# Patient Record
Sex: MALE | Race: WHITE | NOT HISPANIC OR LATINO
[De-identification: names, ages, dates, MRNs, and addresses within clinical notes are randomized per-mention and may not be internally consistent; named-entity substitution may affect disease eponyms.]

---

## 2018-08-31 ENCOUNTER — TRANSCRIPTION ENCOUNTER (OUTPATIENT)
Age: 53
End: 2018-08-31

## 2018-08-31 ENCOUNTER — EMERGENCY (EMERGENCY)
Facility: HOSPITAL | Age: 53
LOS: 1 days | Discharge: ANOTHER HOSPITAL | End: 2018-08-31
Attending: EMERGENCY MEDICINE
Payer: COMMERCIAL

## 2018-08-31 ENCOUNTER — INPATIENT (INPATIENT)
Facility: HOSPITAL | Age: 53
LOS: 1 days | Discharge: HOME | DRG: 247 | End: 2018-09-02
Attending: INTERNAL MEDICINE | Admitting: INTERNAL MEDICINE
Payer: COMMERCIAL

## 2018-08-31 VITALS
HEART RATE: 63 BPM | TEMPERATURE: 98 F | SYSTOLIC BLOOD PRESSURE: 155 MMHG | OXYGEN SATURATION: 100 % | DIASTOLIC BLOOD PRESSURE: 62 MMHG | RESPIRATION RATE: 19 BRPM

## 2018-08-31 VITALS
DIASTOLIC BLOOD PRESSURE: 99 MMHG | HEART RATE: 64 BPM | OXYGEN SATURATION: 100 % | RESPIRATION RATE: 14 BRPM | TEMPERATURE: 97 F | SYSTOLIC BLOOD PRESSURE: 158 MMHG

## 2018-08-31 VITALS
HEART RATE: 66 BPM | DIASTOLIC BLOOD PRESSURE: 93 MMHG | WEIGHT: 220.02 LBS | RESPIRATION RATE: 18 BRPM | OXYGEN SATURATION: 96 % | HEIGHT: 67 IN | SYSTOLIC BLOOD PRESSURE: 141 MMHG

## 2018-08-31 DIAGNOSIS — I21.4 NON-ST ELEVATION (NSTEMI) MYOCARDIAL INFARCTION: ICD-10-CM

## 2018-08-31 DIAGNOSIS — S83.207S UNSPECIFIED TEAR OF UNSPECIFIED MENISCUS, CURRENT INJURY, LEFT KNEE, SEQUELA: Chronic | ICD-10-CM

## 2018-08-31 DIAGNOSIS — Z86.018 PERSONAL HISTORY OF OTHER BENIGN NEOPLASM: Chronic | ICD-10-CM

## 2018-08-31 LAB
ALBUMIN SERPL ELPH-MCNC: 3.9 G/DL — SIGNIFICANT CHANGE UP (ref 3.3–5)
ALP SERPL-CCNC: 67 U/L — SIGNIFICANT CHANGE UP (ref 40–120)
ALT FLD-CCNC: 33 U/L — SIGNIFICANT CHANGE UP (ref 12–78)
ANION GAP SERPL CALC-SCNC: 7 MMOL/L — SIGNIFICANT CHANGE UP (ref 5–17)
APTT BLD: 34.7 SEC — SIGNIFICANT CHANGE UP (ref 27.5–37.4)
AST SERPL-CCNC: 21 U/L — SIGNIFICANT CHANGE UP (ref 15–37)
BASOPHILS # BLD AUTO: 0.1 K/UL — SIGNIFICANT CHANGE UP (ref 0–0.2)
BASOPHILS NFR BLD AUTO: 1.5 % — SIGNIFICANT CHANGE UP (ref 0–2)
BILIRUB SERPL-MCNC: 0.6 MG/DL — SIGNIFICANT CHANGE UP (ref 0.2–1.2)
BUN SERPL-MCNC: 17 MG/DL — SIGNIFICANT CHANGE UP (ref 7–23)
CALCIUM SERPL-MCNC: 8.5 MG/DL — SIGNIFICANT CHANGE UP (ref 8.5–10.1)
CHLORIDE SERPL-SCNC: 105 MMOL/L — SIGNIFICANT CHANGE UP (ref 96–108)
CK MB BLD-MCNC: 10.1 % — HIGH (ref 0–3.5)
CK MB CFR SERPL CALC: 1 NG/ML — SIGNIFICANT CHANGE UP (ref 0–3.6)
CK MB CFR SERPL CALC: 55.9 NG/ML — HIGH (ref 0–3.6)
CK SERPL-CCNC: 551 U/L — HIGH (ref 26–308)
CO2 SERPL-SCNC: 28 MMOL/L — SIGNIFICANT CHANGE UP (ref 22–31)
CREAT SERPL-MCNC: 0.99 MG/DL — SIGNIFICANT CHANGE UP (ref 0.5–1.3)
EOSINOPHIL # BLD AUTO: 0.22 K/UL — SIGNIFICANT CHANGE UP (ref 0–0.5)
EOSINOPHIL NFR BLD AUTO: 3.3 % — SIGNIFICANT CHANGE UP (ref 0–6)
GLUCOSE SERPL-MCNC: 99 MG/DL — SIGNIFICANT CHANGE UP (ref 70–99)
HCT VFR BLD CALC: 46.1 % — SIGNIFICANT CHANGE UP (ref 39–50)
HGB BLD-MCNC: 16 G/DL — SIGNIFICANT CHANGE UP (ref 13–17)
IMM GRANULOCYTES NFR BLD AUTO: 0.3 % — SIGNIFICANT CHANGE UP (ref 0–1.5)
INR BLD: 1.13 RATIO — SIGNIFICANT CHANGE UP (ref 0.88–1.16)
LYMPHOCYTES # BLD AUTO: 1.63 K/UL — SIGNIFICANT CHANGE UP (ref 1–3.3)
LYMPHOCYTES # BLD AUTO: 24.6 % — SIGNIFICANT CHANGE UP (ref 13–44)
MCHC RBC-ENTMCNC: 30.5 PG — SIGNIFICANT CHANGE UP (ref 27–34)
MCHC RBC-ENTMCNC: 34.7 GM/DL — SIGNIFICANT CHANGE UP (ref 32–36)
MCV RBC AUTO: 88 FL — SIGNIFICANT CHANGE UP (ref 80–100)
MONOCYTES # BLD AUTO: 0.66 K/UL — SIGNIFICANT CHANGE UP (ref 0–0.9)
MONOCYTES NFR BLD AUTO: 10 % — SIGNIFICANT CHANGE UP (ref 2–14)
NEUTROPHILS # BLD AUTO: 3.99 K/UL — SIGNIFICANT CHANGE UP (ref 1.8–7.4)
NEUTROPHILS NFR BLD AUTO: 60.3 % — SIGNIFICANT CHANGE UP (ref 43–77)
PLATELET # BLD AUTO: 319 K/UL — SIGNIFICANT CHANGE UP (ref 150–400)
POTASSIUM SERPL-MCNC: 4.1 MMOL/L — SIGNIFICANT CHANGE UP (ref 3.5–5.3)
POTASSIUM SERPL-SCNC: 4.1 MMOL/L — SIGNIFICANT CHANGE UP (ref 3.5–5.3)
PROT SERPL-MCNC: 7.7 G/DL — SIGNIFICANT CHANGE UP (ref 6–8.3)
PROTHROM AB SERPL-ACNC: 12.3 SEC — SIGNIFICANT CHANGE UP (ref 9.8–12.7)
RBC # BLD: 5.24 M/UL — SIGNIFICANT CHANGE UP (ref 4.2–5.8)
RBC # FLD: 12.9 % — SIGNIFICANT CHANGE UP (ref 10.3–14.5)
SODIUM SERPL-SCNC: 140 MMOL/L — SIGNIFICANT CHANGE UP (ref 135–145)
TROPONIN I SERPL-MCNC: 0.03 NG/ML — SIGNIFICANT CHANGE UP (ref 0.01–0.04)
TROPONIN I SERPL-MCNC: 10.7 NG/ML — HIGH (ref 0.01–0.04)
WBC # BLD: 6.62 K/UL — SIGNIFICANT CHANGE UP (ref 3.8–10.5)
WBC # FLD AUTO: 6.62 K/UL — SIGNIFICANT CHANGE UP (ref 3.8–10.5)

## 2018-08-31 PROCEDURE — 71046 X-RAY EXAM CHEST 2 VIEWS: CPT | Mod: 26

## 2018-08-31 PROCEDURE — 96374 THER/PROPH/DIAG INJ IV PUSH: CPT

## 2018-08-31 PROCEDURE — 71046 X-RAY EXAM CHEST 2 VIEWS: CPT

## 2018-08-31 PROCEDURE — 93005 ELECTROCARDIOGRAM TRACING: CPT

## 2018-08-31 PROCEDURE — 85610 PROTHROMBIN TIME: CPT

## 2018-08-31 PROCEDURE — 85027 COMPLETE CBC AUTOMATED: CPT

## 2018-08-31 PROCEDURE — 80053 COMPREHEN METABOLIC PANEL: CPT

## 2018-08-31 PROCEDURE — 93458 L HRT ARTERY/VENTRICLE ANGIO: CPT | Mod: 26,59

## 2018-08-31 PROCEDURE — 36415 COLL VENOUS BLD VENIPUNCTURE: CPT

## 2018-08-31 PROCEDURE — 82553 CREATINE MB FRACTION: CPT

## 2018-08-31 PROCEDURE — 99285 EMERGENCY DEPT VISIT HI MDM: CPT

## 2018-08-31 PROCEDURE — 85730 THROMBOPLASTIN TIME PARTIAL: CPT

## 2018-08-31 PROCEDURE — 84484 ASSAY OF TROPONIN QUANT: CPT

## 2018-08-31 PROCEDURE — 92941 PRQ TRLML REVSC TOT OCCL AMI: CPT | Mod: LC

## 2018-08-31 PROCEDURE — 82550 ASSAY OF CK (CPK): CPT

## 2018-08-31 PROCEDURE — 93010 ELECTROCARDIOGRAM REPORT: CPT | Mod: 76

## 2018-08-31 PROCEDURE — 99285 EMERGENCY DEPT VISIT HI MDM: CPT | Mod: 25

## 2018-08-31 PROCEDURE — 99152 MOD SED SAME PHYS/QHP 5/>YRS: CPT

## 2018-08-31 RX ORDER — ATORVASTATIN CALCIUM 80 MG/1
1 TABLET, FILM COATED ORAL
Qty: 30 | Refills: 0
Start: 2018-08-31 | End: 2018-09-29

## 2018-08-31 RX ORDER — ASPIRIN/CALCIUM CARB/MAGNESIUM 324 MG
325 TABLET ORAL DAILY
Qty: 0 | Refills: 0 | Status: DISCONTINUED | OUTPATIENT
Start: 2018-08-31 | End: 2018-09-01

## 2018-08-31 RX ORDER — ATORVASTATIN CALCIUM 80 MG/1
80 TABLET, FILM COATED ORAL AT BEDTIME
Qty: 0 | Refills: 0 | Status: DISCONTINUED | OUTPATIENT
Start: 2018-08-31 | End: 2018-09-02

## 2018-08-31 RX ORDER — CLOPIDOGREL BISULFATE 75 MG/1
1 TABLET, FILM COATED ORAL
Qty: 30 | Refills: 11
Start: 2018-08-31 | End: 2019-08-25

## 2018-08-31 RX ORDER — ASPIRIN/CALCIUM CARB/MAGNESIUM 324 MG
81 TABLET ORAL DAILY
Qty: 0 | Refills: 0 | Status: DISCONTINUED | OUTPATIENT
Start: 2018-08-31 | End: 2018-08-31

## 2018-08-31 RX ORDER — CLOPIDOGREL BISULFATE 75 MG/1
75 TABLET, FILM COATED ORAL DAILY
Qty: 0 | Refills: 0 | Status: DISCONTINUED | OUTPATIENT
Start: 2018-08-31 | End: 2018-09-02

## 2018-08-31 RX ORDER — CLOPIDOGREL BISULFATE 75 MG/1
1 TABLET, FILM COATED ORAL
Qty: 0 | Refills: 0 | COMMUNITY

## 2018-08-31 RX ORDER — FLUOXETINE HCL 10 MG
40 CAPSULE ORAL DAILY
Qty: 0 | Refills: 0 | Status: DISCONTINUED | OUTPATIENT
Start: 2018-08-31 | End: 2018-09-02

## 2018-08-31 RX ORDER — HEPARIN SODIUM 5000 [USP'U]/ML
5000 INJECTION INTRAVENOUS; SUBCUTANEOUS ONCE
Qty: 0 | Refills: 0 | Status: COMPLETED | OUTPATIENT
Start: 2018-08-31 | End: 2018-08-31

## 2018-08-31 RX ORDER — METOPROLOL TARTRATE 50 MG
25 TABLET ORAL EVERY 12 HOURS
Qty: 0 | Refills: 0 | Status: DISCONTINUED | OUTPATIENT
Start: 2018-08-31 | End: 2018-09-02

## 2018-08-31 RX ORDER — ASPIRIN/CALCIUM CARB/MAGNESIUM 324 MG
1 TABLET ORAL
Qty: 0 | Refills: 0 | COMMUNITY
Start: 2018-08-31

## 2018-08-31 RX ORDER — SODIUM CHLORIDE 9 MG/ML
3 INJECTION INTRAMUSCULAR; INTRAVENOUS; SUBCUTANEOUS ONCE
Qty: 0 | Refills: 0 | Status: COMPLETED | OUTPATIENT
Start: 2018-08-31 | End: 2018-08-31

## 2018-08-31 RX ORDER — ASPIRIN/CALCIUM CARB/MAGNESIUM 324 MG
1 TABLET ORAL
Qty: 0 | Refills: 0 | COMMUNITY

## 2018-08-31 RX ORDER — ASPIRIN/CALCIUM CARB/MAGNESIUM 324 MG
325 TABLET ORAL ONCE
Qty: 0 | Refills: 0 | Status: COMPLETED | OUTPATIENT
Start: 2018-08-31 | End: 2018-08-31

## 2018-08-31 RX ORDER — CLOPIDOGREL BISULFATE 75 MG/1
300 TABLET, FILM COATED ORAL ONCE
Qty: 0 | Refills: 0 | Status: COMPLETED | OUTPATIENT
Start: 2018-08-31 | End: 2018-08-31

## 2018-08-31 RX ORDER — FLUOXETINE HCL 10 MG
0 CAPSULE ORAL
Qty: 0 | Refills: 0 | COMMUNITY

## 2018-08-31 RX ORDER — METOPROLOL TARTRATE 50 MG
1 TABLET ORAL
Qty: 60 | Refills: 0 | OUTPATIENT
Start: 2018-08-31 | End: 2018-09-29

## 2018-08-31 RX ADMIN — ATORVASTATIN CALCIUM 80 MILLIGRAM(S): 80 TABLET, FILM COATED ORAL at 20:53

## 2018-08-31 RX ADMIN — Medication 325 MILLIGRAM(S): at 09:27

## 2018-08-31 RX ADMIN — CLOPIDOGREL BISULFATE 300 MILLIGRAM(S): 75 TABLET, FILM COATED ORAL at 15:17

## 2018-08-31 RX ADMIN — HEPARIN SODIUM 5000 UNIT(S): 5000 INJECTION INTRAVENOUS; SUBCUTANEOUS at 15:17

## 2018-08-31 RX ADMIN — Medication 25 MILLIGRAM(S): at 20:50

## 2018-08-31 RX ADMIN — SODIUM CHLORIDE 3 MILLILITER(S): 9 INJECTION INTRAMUSCULAR; INTRAVENOUS; SUBCUTANEOUS at 08:20

## 2018-08-31 NOTE — ED ADULT NURSE REASSESSMENT NOTE - NS ED NURSE REASSESS COMMENT FT1
report given to Martir LANZA [facility] in [place]  will monitor support and safety maintained. report given to Martir LANZA from Cath Lab in [place]  will monitor support and safety maintained.

## 2018-08-31 NOTE — CONSULT NOTE ADULT - ATTENDING COMMENTS
I personally saw and examined the patient in detail.  I have spoken to the above provider regarding the assessment and plan of care.  I reviewed the above assessment and plan of care, and agree.  I have made changes in the body of the note where appropriate.  Transfer to  for cardiac cath.

## 2018-08-31 NOTE — CONSULT NOTE ADULT - SUBJECTIVE AND OBJECTIVE BOX
[facility]         Karyn Travis, Pato, Frank, Kiah, Kevin, Amalia        [phone] (office)    CHIEF COMPLAINT: Patient is a 53y old  Male who presents with a chief complaint of b/l shoulder pain radiating to jaw    HPI: 52 y/o M with PMHx of anxiety presented to the ED complaining of bilateral shoulder pain radiating to jaw x 2 hours. States that he had a similar episode 2 days prior to arrival, resolving within 15-20 minutes. At that time, he noticed this while on the treadmill. This episode occurred while he was in bed right after he woke up. Had a stress test in the [place] office in 2015, normal at the time. Admitted to associated lightheadedness at the time. No dyspnea, orthopnea or PND. Currently free of chest pain.      PAST MEDICAL & SURGICAL HISTORY:  Anxiety      SOCIAL HISTORY: No illicit drug use. Former cigarette smoker quit 25 years ago. Current cigar smoker 2-3/week. Social EtOH use.    FAMILY HISTORY:  Family history of MI in maternal and paternal grandfathers  Family history of brain CA in father  Family history of lung CA in mother    Outpatient medications:  prozac    MEDICATIONS  (STANDING):    MEDICATIONS  (PRN):      Allergies    No Known Allergies    Intolerances        REVIEW OF SYSTEMS: Is negative for eye, ENT, GI, , allergic, dermatologic, musculoskeletal and neurologic, except as described above.    VITAL SIGNS:   Vital Signs Last 24 Hrs  T(C): 36.6 (31 Aug 2018 12:03), Max: 36.6 (31 Aug 2018 12:03)  T(F): 97.8 (31 Aug 2018 12:03), Max: 97.8 (31 Aug 2018 12:03)  HR: 62 (31 Aug 2018 12:03) (62 - 64)  BP: 107/56 (31 Aug 2018 12:03) (107/56 - 158/99)  BP(mean): --  RR: 16 (31 Aug 2018 12:03) (14 - 16)  SpO2: 100% (31 Aug 2018 12:03) (100% - 100%)    I&O's Summary      PHYSICAL EXAM:    Constitutional: NAD, awake and alert, well-developed  Eyes:  EOMI, no oral cyanosis, conjunctivae clear, anicteric.  Pulmonary: Non-labored, breath sounds are clear bilaterally, no wheezing, rales or rhonchi  Cardiovascular:  regular S1 and S2. No murmur.  No rubs, gallops or clicks  Gastrointestinal: Bowel Sounds present, soft, nontender.   Lymph: No peripheral edema.   Neurological: Alert, strength and sensitivity are grossly intact  Skin: No obvious lesions/rashes.   Psych:  Mood & affect appropriate .    LABS: All Labs Reviewed:                        16.0   6.62  )-----------( 319      ( 31 Aug 2018 08:41 )             46.1     31 Aug 2018 08:41    140    |  105    |  17     ----------------------------<  99     4.1     |  28     |  0.99     Ca    8.5        31 Aug 2018 08:41    TPro  7.7    /  Alb  3.9    /  TBili  0.6    /  DBili  x      /  AST  21     /  ALT  33     /  AlkPhos  67     31 Aug 2018 08:41    PT/INR - ( 31 Aug 2018 08:41 )   PT: 12.3 sec;   INR: 1.13 ratio         PTT - ( 31 Aug 2018 08:41 )  PTT:34.7 sec  CARDIAC MARKERS ( 31 Aug 2018 08:41 )  .029 ng/mL / x     / x     / x     / 1.0 ng/mL      RADIOLOGY:  < from: Xray Chest 2 Views PA/Lat (08.31.18 @ 08:46) >    EXAM:  XR CHEST PA LAT 2V                            PROCEDURE DATE:  08/31/2018          INTERPRETATION:  XR CHEST PA LAT 2V    HISTORY:  Chest Pain    Views:  PA and Lateral chest.       Comparison:  none.    The lung fields demonstrate normal degree of inflation.    There is no evidence of pneumonia.    The pulmonary interstitial markings are normal.    There is no evidence of hilar enlargement.    The thoracic aorta outlines normally.    The cardiac size is normal.    The diaphragm is smooth.     There are no pleural effusions. Costophrenic sulci are sharp.     The thoracic spine demonstrates no significant degenerative changes.  The   bony structures are normally mineralized.    IMPRESSION: Normal chest radiograph.        STERLING MARTI M.D., ATTENDING RADIOLOGIST  This document has been electronically signed. Aug 31 2018  9:38AM    < end of copied text >    EKG:  < from: 12 Lead ECG (08.31.18 @ 08:04) >  Ventricular Rate 66 BPM    Atrial Rate 66 BPM    P-R Interval 162 ms    QRS Duration 100 ms    Q-T Interval 404 ms    QTC Calculation(Bezet) 423 ms    P Axis 35 degrees    R Axis 7 degrees    T Axis 15 degrees    Diagnosis Line Normal sinus rhythm  Cannot rule out Inferior infarct , age undetermined  Abnormal ECG  No previous ECGs available  Confirmed by Eddie BRIDGES, Arturo ([id number]) on 8/31/2018 9:44:52 AM    < end of copied text >

## 2018-08-31 NOTE — DISCHARGE NOTE ADULT - MEDICATION SUMMARY - MEDICATIONS TO STOP TAKING
I will STOP taking the medications listed below when I get home from the hospital:  None I will STOP taking the medications listed below when I get home from the hospital:    Plavix 300 mg oral tablet  -- 1 tab(s) by mouth once    hospital    aspirin 325 mg oral tablet  -- 1 tab(s) by mouth once    hospital    Plavix 300 mg oral tablet  -- 1 tab(s) by mouth once    hospital MDD:1

## 2018-08-31 NOTE — DISCHARGE NOTE ADULT - HOSPITAL COURSE
52 y/o M with PMHx of Anxiety, ANYA-uses CPAP at home, presented to the ED [place] on 8/31/18 complaining of bilateral shoulder pain radiating to jaw x 2 hours. States that he had a similar episode 2 days prior to arrival, resolving within 15-20 minutes. At that time, he noticed this while on the treadmill. This episode occurred while he was in bed right after he woke up. Had a stress test in the [place] office in 2015, normal at the time. Admitted to associated lightheadedness at the time. No dyspnea, orthopnea or PND. Currently free of chest pain.  - Initial troponin was negative, repeat was 10.7; received aspirin 325mg in the ED  - EKG NSR at 66bpm with Q waves in lead III, repeat EKG with positive troponin was unchanged  Plavix 300mg loading dose and 5000u of heparin subQ given for ACS Protocol.   Pt was transferred to [place] for cardiac cath. 52 y/o M with PMHx of Anxiety, ANYA-uses CPAP at home, presented to the ED [place] on 8/31/18 complaining of bilateral shoulder pain radiating to jaw x 2 hours. States that he had a similar episode 2 days prior to arrival, resolving within 15-20 minutes. At that time, he noticed this while on the treadmill. This episode occurred while he was in bed right after he woke up. Had a stress test in the [place] office in 2015, normal at the time. Admitted to associated lightheadedness at the time. No dyspnea, orthopnea or PND. Currently free of chest pain.  - Initial troponin was negative, repeat was 10.7; received aspirin 325mg in the ED  - EKG NSR at 66bpm with Q waves in lead III, repeat EKG with positive troponin was unchanged  Plavix 300mg loading dose and 5000u of heparin subQ given for ACS Protocol.   Pt was transferred to [place] for cardiac cath.   Pt s/p PCI: MIK x1 to LCX via R radial. Pt tolerated procedure well and overnight remained uneventful. No c/o chest pain or SOB. Pt is hemodynamically stable, EKG and all lab results reviewed. Insertion/incision site benign, no bleeding or hematoma, and cath site dressing changed. Discharge teaching provided to Pt/caretaker and verbalized understanding the instruction. Pt is stable for discharge home as per attending.

## 2018-08-31 NOTE — CHART NOTE - NSCHARTNOTEFT_GEN_A_CORE
Removal of Radial Band    Pulses in the (right) upper extremity are (palpable). The patient was placed in the supine position. The insertion site was identified and the band deflated per protocol. The radial band was removed slowly. Direct pressure was applied for  __3____ minutes/not applicable.     Radial cath site: no evidence of bleeding or hematoma. Radial pulse palpited and cap refill < 2 seconds.     Monitoring of the (right) wrists and both upper extremities including neuro-vascular checks and vital signs every 15 minutes x 4.    Complications: None.    Comments:

## 2018-08-31 NOTE — DISCHARGE NOTE ADULT - CARE PROVIDER_API CALL
Jerry Palacio), Cardiovascular Disease; Interventional Cardiology  [address] [place] [address]  Phone: [phone]  Fax: [phone]

## 2018-08-31 NOTE — ED PROVIDER NOTE - CARE PLAN
Principal Discharge DX:	Chest pain, unspecified type Principal Discharge DX:	NSTEMI (non-ST elevated myocardial infarction)

## 2018-08-31 NOTE — DISCHARGE NOTE ADULT - CARE PLAN
Principal Discharge DX:	Coronary artery disease due to lipid rich plaque  Goal:	Pt remains chest pain free and understands post cath discharge instructions  Assessment and plan of treatment:	Do not MISS ANY DOSEs or STOP you Aspirin/Plavix, because these drugs helps to keep your sten open, unless instructed to do so by your cardiologist.   No heavy lifting or pushing/pulling with procedure arm for 2 weeks. No driving for 2 days. You may shower 24 hours following the procedure but avoid baths/swimming for 1 week. Check your wrist site for bleeding and/or swelling daily following procedure and call your doctor immediately if it occurs or if you experience increased pain at the site. Follow up with your cardiologist in 1-2 weeks. You may call [facility] if you have any questions/concerns regarding your procedure [phone].   Lifestyle modification: include healthy habits to prevent stroke and future CAD  Low salt, low fat diet.   Weight management.   Take medications as prescribed.    No smoking.  Follow up with your cardiologist or primary doctor in 1- 2 weeks.  Secondary Diagnosis:	Mixed hyperlipidemia  Goal:	Your LDL cholesterol will be less than 70mg/dL  Assessment and plan of treatment:	Continue with your cholesterol medications. Eat a heart healthy diet that is low in saturated fats and salt, and includes whole grains, fruits, vegetables and lean protein; exercise regularly (consult with your physician or cardiologist first); maintain a heart healthy weight; if you smoke - quit (A resource to help you stop smoking is the [facility] – phone number [phone].). Continue to follow with your primary physician or cardiologist.

## 2018-08-31 NOTE — H&P CARDIOLOGY - HISTORY OF PRESENT ILLNESS
52 y/o M with PMHx of anxiety presented to the ED [place] on 8/31/18 complaining of bilateral shoulder pain radiating to jaw x 2 hours. States that he had a similar episode 2 days prior to arrival, resolving within 15-20 minutes. At that time, he noticed this while on the treadmill. This episode occurred while he was in bed right after he woke up. Had a stress test in the [place] office in 2015, normal at the time. Admitted to associated lightheadedness at the time. No dyspnea, orthopnea or PND. Currently free of chest pain. 54 y/o M with PMHx of anxiety presented to the ED [place] on 8/31/18 complaining of bilateral shoulder pain radiating to jaw x 2 hours. States that he had a similar episode 2 days prior to arrival, resolving within 15-20 minutes. At that time, he noticed this while on the treadmill. This episode occurred while he was in bed right after he woke up. Had a stress test in the [place] office in 2015, normal at the time. Admitted to associated lightheadedness at the time. No dyspnea, orthopnea or PND. Currently free of chest pain.    - Initial troponin was negative, repeat was 10.7; received aspirin 325mg in the ED  - EKG NSR at 66bpm with Q waves in lead III, repeat EKG with positive troponin was unchanged  Plavix 600mg loading dose and 5000u of heparin subQ given for ACS Protocol.     Pt was transferred to [place] for cardiac cath. 54 y/o M with PMHx of Anxiety, ANYA-uses CPAP at home, presented to the ED [place] on 8/31/18 complaining of bilateral shoulder pain radiating to jaw x 2 hours. States that he had a similar episode 2 days prior to arrival, resolving within 15-20 minutes. At that time, he noticed this while on the treadmill. This episode occurred while he was in bed right after he woke up. Had a stress test in the [place] office in 2015, normal at the time. Admitted to associated lightheadedness at the time. No dyspnea, orthopnea or PND. Currently free of chest pain.    - Initial troponin was negative, repeat was 10.7; received aspirin 325mg in the ED  - EKG NSR at 66bpm with Q waves in lead III, repeat EKG with positive troponin was unchanged  Plavix 600mg loading dose and 5000u of heparin subQ given for ACS Protocol.     Pt was transferred to [place] for cardiac cath. 54 y/o M with PMHx of Anxiety, ANYA-uses CPAP at home, presented to the ED [place] on 8/31/18 complaining of bilateral shoulder pain radiating to jaw x 2 hours. States that he had a similar episode 2 days prior to arrival, resolving within 15-20 minutes. At that time, he noticed this while on the treadmill. This episode occurred while he was in bed right after he woke up. Had a stress test in the [place] office in 2015, normal at the time. Admitted to associated lightheadedness at the time. No dyspnea, orthopnea or PND. Currently free of chest pain.    - Initial troponin was negative, repeat was 10.7; received aspirin 325mg in the ED  - EKG NSR at 66bpm with Q waves in lead III, repeat EKG with positive troponin was unchanged  Plavix 300mg loading dose and 5000u of heparin subQ given for ACS Protocol.     Pt was transferred to [place] for cardiac cath.

## 2018-08-31 NOTE — ED ADULT NURSE NOTE - OBJECTIVE STATEMENT
Patient complaining of mid chest discomfort  X 2 hours. Denies nausea, vomiting. + dizziness. Chest pain radiates to jaw.

## 2018-08-31 NOTE — ED ADULT NURSE NOTE - NSIMPLEMENTINTERV_GEN_ALL_ED
Implemented All Universal Safety Interventions:  [place] to call system. Call bell, personal items and telephone within reach. Instruct patient to call for assistance. Room bathroom lighting operational. Non-slip footwear when patient is off stretcher. Physically safe environment: no spills, clutter or unnecessary equipment. Stretcher in lowest position, wheels locked, appropriate side rails in place.

## 2018-08-31 NOTE — CONSULT NOTE ADULT - ASSESSMENT
52 y/o M with PMHx of anxiety presented to the ED complaining of bilateral shoulder pain radiating to jaw x 2 hours this morning while at rest. Symptoms currently resolved and at baseline now.    - No clear evidence of acute ischemia, trops negative x 1; ordered for repeat set  - His CKs are flat, which suggests against acute atherosclerotic plaque rupture  - No evidence of volume overload  - EKG NSR at 66bpm with Q waves in lead III  - No prior echocardiogram was performed  - Monitor and replete lytes, keep K>4, Mg>2  - If repeat set of CE's remain negative, can be discharged home from a cardiovascular standpoint  - Recommend outpatient follow up for repeat stress test and echocardiogram  - Other cardiovascular workup will depend on clinical course. 54 y/o M with PMHx of anxiety presented to the ED complaining of bilateral shoulder pain radiating to jaw x 2 hours this morning while at rest. Symptoms currently resolved and at baseline now. Found to have elevated troponin on repeat set, NSTEMI.    - Initial troponin was negative, repeat was 10.7; received aspirin 325mg in the ED  - EKG NSR at 66bpm with Q waves in lead III, repeat EKG with positive troponin was unchanged  - Give 600mg plavix for loading dose now.   - Give 5000u of heparin subQ  - Will transfer to [place] for cardiac cath with Dr. Solitario  - No evidence of volume overload  - No prior echocardiogram was performed  - Monitor and replete lytes, keep K>4, Mg>2  - Other cardiovascular workup will depend on clinical course.

## 2018-08-31 NOTE — ED ADULT NURSE REASSESSMENT NOTE - NS ED NURSE REASSESS COMMENT FT1
pt mentating at baseline. no change in neuro status. pt denies any chest pain at this time, patient transfer to Cath Lab [place].

## 2018-08-31 NOTE — ED ADULT NURSE REASSESSMENT NOTE - NS ED NURSE REASSESS COMMENT FT1
Patient troponin 10.7 , Dr Mejía aware, stat repeat  EKG done, patient denies any chest pain on cardiac monitor support and safety maintained pending transport to [place]'s cath lab.

## 2018-08-31 NOTE — ED PROVIDER NOTE - OBJECTIVE STATEMENT
54 y/o M with hx of anxiety with c/o diffuse chest pain radiating to b/l jaw since 6:15 this morning.  Symptoms have improved but not resolved.  Pt states chest pain feels like a dull ache.  Pt denies SOB, fevers, cough.     PCP: Jose Roberto Villanueva

## 2018-08-31 NOTE — DISCHARGE NOTE ADULT - PLAN OF CARE
Pt remains chest pain free and understands post cath discharge instructions Do not MISS ANY DOSEs or STOP you Aspirin/Plavix, because these drugs helps to keep your sten open, unless instructed to do so by your cardiologist.   No heavy lifting or pushing/pulling with procedure arm for 2 weeks. No driving for 2 days. You may shower 24 hours following the procedure but avoid baths/swimming for 1 week. Check your wrist site for bleeding and/or swelling daily following procedure and call your doctor immediately if it occurs or if you experience increased pain at the site. Follow up with your cardiologist in 1-2 weeks. You may call [facility] [facility] if you have any questions/concerns regarding your procedure [phone].   Lifestyle modification: include healthy habits to prevent stroke and future CAD  Low salt, low fat diet.   Weight management.   Take medications as prescribed.    No smoking.  Follow up with your cardiologist or primary doctor in 1- 2 weeks. Your LDL cholesterol will be less than 70mg/dL Continue with your cholesterol medications. Eat a heart healthy diet that is low in saturated fats and salt, and includes whole grains, fruits, vegetables and lean protein; exercise regularly (consult with your physician or cardiologist first); maintain a heart healthy weight; if you smoke - quit (A resource to help you stop smoking is the [facility] – phone number [phone].). Continue to follow with your primary physician or cardiologist.

## 2018-08-31 NOTE — DISCHARGE NOTE ADULT - PATIENT PORTAL LINK FT
You can access the [facility] Patient Portal, offered by [facility], by registering with the following website: [email or web address]

## 2018-09-01 PROBLEM — F41.9 ANXIETY DISORDER, UNSPECIFIED: Chronic | Status: ACTIVE | Noted: 2018-08-31

## 2018-09-01 LAB
ANION GAP SERPL CALC-SCNC: 11 MMOL/L — SIGNIFICANT CHANGE UP (ref 5–17)
BUN SERPL-MCNC: 15 MG/DL — SIGNIFICANT CHANGE UP (ref 7–23)
CALCIUM SERPL-MCNC: 9.4 MG/DL — SIGNIFICANT CHANGE UP (ref 8.4–10.5)
CHLORIDE SERPL-SCNC: 103 MMOL/L — SIGNIFICANT CHANGE UP (ref 96–108)
CHOLEST SERPL-MCNC: 223 MG/DL — HIGH (ref 10–199)
CK MB BLD-MCNC: 8.5 % — HIGH (ref 0–3.5)
CK MB BLD-MCNC: 9.3 % — HIGH (ref 0–3.5)
CK MB CFR SERPL CALC: 33.7 NG/ML — HIGH (ref 0–6.7)
CK MB CFR SERPL CALC: 49.7 NG/ML — HIGH (ref 0–6.7)
CK SERPL-CCNC: 395 U/L — HIGH (ref 30–200)
CK SERPL-CCNC: 532 U/L — HIGH (ref 30–200)
CO2 SERPL-SCNC: 24 MMOL/L — SIGNIFICANT CHANGE UP (ref 22–31)
CREAT SERPL-MCNC: 0.9 MG/DL — SIGNIFICANT CHANGE UP (ref 0.5–1.3)
GLUCOSE SERPL-MCNC: 95 MG/DL — SIGNIFICANT CHANGE UP (ref 70–99)
HCT VFR BLD CALC: 47.1 % — SIGNIFICANT CHANGE UP (ref 39–50)
HDLC SERPL-MCNC: 51 MG/DL — SIGNIFICANT CHANGE UP
HGB BLD-MCNC: 16.3 G/DL — SIGNIFICANT CHANGE UP (ref 13–17)
LIPID PNL WITH DIRECT LDL SERPL: 152 MG/DL — HIGH
MCHC RBC-ENTMCNC: 30.9 PG — SIGNIFICANT CHANGE UP (ref 27–34)
MCHC RBC-ENTMCNC: 34.6 GM/DL — SIGNIFICANT CHANGE UP (ref 32–36)
MCV RBC AUTO: 89.4 FL — SIGNIFICANT CHANGE UP (ref 80–100)
PLATELET # BLD AUTO: 325 K/UL — SIGNIFICANT CHANGE UP (ref 150–400)
POTASSIUM SERPL-MCNC: 3.5 MMOL/L — SIGNIFICANT CHANGE UP (ref 3.5–5.3)
POTASSIUM SERPL-SCNC: 3.5 MMOL/L — SIGNIFICANT CHANGE UP (ref 3.5–5.3)
RBC # BLD: 5.28 M/UL — SIGNIFICANT CHANGE UP (ref 4.2–5.8)
RBC # FLD: 13.2 % — SIGNIFICANT CHANGE UP (ref 10.3–14.5)
SODIUM SERPL-SCNC: 138 MMOL/L — SIGNIFICANT CHANGE UP (ref 135–145)
TOTAL CHOLESTEROL/HDL RATIO MEASUREMENT: 4.4 RATIO — SIGNIFICANT CHANGE UP (ref 3.4–9.6)
TRIGL SERPL-MCNC: 98 MG/DL — SIGNIFICANT CHANGE UP (ref 10–149)
WBC # BLD: 9 K/UL — SIGNIFICANT CHANGE UP (ref 3.8–10.5)
WBC # FLD AUTO: 9 K/UL — SIGNIFICANT CHANGE UP (ref 3.8–10.5)

## 2018-09-01 PROCEDURE — 99223 1ST HOSP IP/OBS HIGH 75: CPT

## 2018-09-01 PROCEDURE — 93306 TTE W/DOPPLER COMPLETE: CPT | Mod: 26

## 2018-09-01 RX ORDER — ASPIRIN/CALCIUM CARB/MAGNESIUM 324 MG
81 TABLET ORAL DAILY
Qty: 0 | Refills: 0 | Status: DISCONTINUED | OUTPATIENT
Start: 2018-09-01 | End: 2018-09-02

## 2018-09-01 RX ORDER — ASPIRIN/CALCIUM CARB/MAGNESIUM 324 MG
1 TABLET ORAL
Qty: 0 | Refills: 0 | DISCHARGE
Start: 2018-09-01

## 2018-09-01 RX ORDER — CLOPIDOGREL BISULFATE 75 MG/1
1 TABLET, FILM COATED ORAL
Qty: 90 | Refills: 3
Start: 2018-09-01 | End: 2019-08-26

## 2018-09-01 RX ORDER — POTASSIUM CHLORIDE 20 MEQ
40 PACKET (EA) ORAL ONCE
Qty: 0 | Refills: 0 | Status: COMPLETED | OUTPATIENT
Start: 2018-09-01 | End: 2018-09-01

## 2018-09-01 RX ADMIN — Medication 25 MILLIGRAM(S): at 17:18

## 2018-09-01 RX ADMIN — ATORVASTATIN CALCIUM 80 MILLIGRAM(S): 80 TABLET, FILM COATED ORAL at 21:41

## 2018-09-01 RX ADMIN — CLOPIDOGREL BISULFATE 75 MILLIGRAM(S): 75 TABLET, FILM COATED ORAL at 05:24

## 2018-09-01 RX ADMIN — Medication 40 MILLIGRAM(S): at 21:41

## 2018-09-01 RX ADMIN — Medication 25 MILLIGRAM(S): at 05:24

## 2018-09-01 RX ADMIN — Medication 40 MILLIEQUIVALENT(S): at 05:24

## 2018-09-01 RX ADMIN — Medication 325 MILLIGRAM(S): at 05:24

## 2018-09-01 NOTE — CONSULT NOTE ADULT - SUBJECTIVE AND OBJECTIVE BOX
[facility] - Karyn Travis, Frank Whyte, Kiah, Amalia Brown  Office Number: [phone]    Initial Consult Note    CHIEF COMPLAINT: Patient is a 53y old  Male who presents with a chief complaint of cp (31 Aug 2018 20:08)      HPI:  54 y/o M with PMHx of Anxiety, ANYA-uses CPAP at home, presented to the ED [place] on 8/31/18 complaining of bilateral shoulder pain radiating to jaw x 2 hours. States that he had a similar episode 2 days prior to arrival, resolving within 15-20 minutes. At that time, he noticed this while on the treadmill. This episode occurred while he was in bed right after he woke up. Had a stress test in the [place] office in 2015, normal at the time. Admitted to associated lightheadedness at the time. No dyspnea, orthopnea or PND. Currently free of chest pain.    - Initial troponin was negative, repeat was 10.7; received aspirin 325mg in the ED  - EKG NSR at 66bpm with Q waves in lead III, repeat EKG with positive troponin was unchanged  Plavix 300mg loading dose and 5000u of heparin subQ given for ACS Protocol.     Pt was transferred to [place] for cardiac cath. (31 Aug 2018 17:14)    He is now s/p PCI to LCx, has ectatic disease of the RCA.  Feeling well this morning.   Telemetry with SR, no significant ectopy.      PAST MEDICAL & SURGICAL HISTORY:  ANYA on CPAP  Anxiety  Tear of meniscus of left knee, sequela  H/O lipoma: neck      SOCIAL HISTORY:  + cigar smoking, social ethanol, or drug abuse.    FAMILY HISTORY:  Family history of heart disease (Grandparent)    No family history of acute MI or sudden cardiac death.    MEDICATIONS  (STANDING):  aspirin enteric coated 325 milliGRAM(s) Oral daily  atorvastatin 80 milliGRAM(s) Oral at bedtime  clopidogrel Tablet 75 milliGRAM(s) Oral daily  FLUoxetine 40 milliGRAM(s) Oral daily  metoprolol tartrate 25 milliGRAM(s) Oral every 12 hours    MEDICATIONS  (PRN):      Allergies    No Known Allergies    Intolerances        REVIEW OF SYSTEMS:    CONSTITUTIONAL: No weakness, fevers or chills  EYES/ENT: No visual changes;  No vertigo or throat pain   NECK: No pain or stiffness  RESPIRATORY: No cough, wheezing, hemoptysis; No shortness of breath  CARDIOVASCULAR: + chest pain, no palpitations  GASTROINTESTINAL: No abdominal pain. No nausea, vomiting, or hematemesis; No diarrhea or constipation. No melena or hematochezia.  GENITOURINARY: No dysuria, frequency or hematuria  NEUROLOGICAL: No numbness or weakness  SKIN: No itching or rash  All other review of systems is negative unless indicated above    VITAL SIGNS:   Vital Signs Last 24 Hrs  T(C): 36.6 (01 Sep 2018 05:25), Max: 36.8 (31 Aug 2018 15:53)  T(F): 97.9 (01 Sep 2018 05:25), Max: 98.3 (31 Aug 2018 15:53)  HR: 57 (01 Sep 2018 05:25) (57 - 90)  BP: 113/84 (01 Sep 2018 05:25) (107/56 - 158/99)  BP(mean): 109 (31 Aug 2018 17:14) (109 - 109)  RR: 17 (01 Sep 2018 05:25) (14 - 19)  SpO2: 97% (01 Sep 2018 05:25) (94% - 100%)    I&O's Summary    31 Aug 2018 07:01  -  01 Sep 2018 07:00  --------------------------------------------------------  IN: 680 mL / OUT: 0 mL / NET: 680 mL        On Exam:    Constitutional: NAD, alert and oriented x 3  Lungs:  Non-labored, breath sounds are clear bilaterally, No wheezing, rales or rhonchi  Cardiovascular: RRR.  S1 and S2 positive.  No murmurs, rubs, gallops or clicks  Gastrointestinal: Bowel Sounds present, soft, nontender.   Lymph: No peripheral edema. No cervical lymphadenopathy.  Neurological: Alert, no focal deficits  Skin: No rashes or ulcers   Psych:  Mood & affect appropriate.    LABS: All Labs Reviewed:                        16.3   9.0   )-----------( 325      ( 31 Aug 2018 23:53 )             47.1                         16.0   6.62  )-----------( 319      ( 31 Aug 2018 08:41 )             46.1     31 Aug 2018 23:53    138    |  103    |  15     ----------------------------<  95     3.5     |  24     |  0.90   31 Aug 2018 08:41    140    |  105    |  17     ----------------------------<  99     4.1     |  28     |  0.99     Ca    9.4        31 Aug 2018 23:53  Ca    8.5        31 Aug 2018 08:41    TPro  7.7    /  Alb  3.9    /  TBili  0.6    /  DBili  x      /  AST  21     /  ALT  33     /  AlkPhos  67     31 Aug 2018 08:41    PT/INR - ( 31 Aug 2018 08:41 )   PT: 12.3 sec;   INR: 1.13 ratio         PTT - ( 31 Aug 2018 08:41 )  PTT:34.7 sec  CARDIAC MARKERS ( 31 Aug 2018 23:53 )  x     / x     / 532 U/L / x     / 49.7 ng/mL  CARDIAC MARKERS ( 31 Aug 2018 14:29 )  10.700 ng/mL / x     / 551 U/L / x     / 55.9 ng/mL  CARDIAC MARKERS ( 31 Aug 2018 08:41 )  .029 ng/mL / x     / x     / x     / 1.0 ng/mL      Blood Culture:         RADIOLOGY:    EKG: SR, inf infarct

## 2018-09-01 NOTE — PROGRESS NOTE ADULT - ASSESSMENT
54 y/o M with anxiety presenting to the ED complaining of bilateral shoulder pain radiating to jaw, found to have troponin elevation, and NSTEMI.  He is s/p cardiac cath and PCI (MIK x 1) to the LCx    - cont aspirin, plavix, statin    - f/u echo     - of cardiac enzymes improve poss dc tomorrow    HTn - bb

## 2018-09-01 NOTE — PROGRESS NOTE ADULT - SUBJECTIVE AND OBJECTIVE BOX
chief complaint : chest pain      SUBJECTIVE / OVERNIGHT EVENTS: pt denies chest pain, shortness of breath, nausea, vomiting       MEDICATIONS  (STANDING):  aspirin enteric coated 81 milliGRAM(s) Oral daily  atorvastatin 80 milliGRAM(s) Oral at bedtime  clopidogrel Tablet 75 milliGRAM(s) Oral daily  FLUoxetine 40 milliGRAM(s) Oral daily  metoprolol tartrate 25 milliGRAM(s) Oral every 12 hours    MEDICATIONS  (PRN):    Vital Signs Last 24 Hrs  T(C): 36.8 (01 Sep 2018 17:15), Max: 36.8 (01 Sep 2018 17:15)  T(F): 98.2 (01 Sep 2018 17:15), Max: 98.2 (01 Sep 2018 17:15)  HR: 69 (01 Sep 2018 17:15) (57 - 90)  BP: 100/65 (01 Sep 2018 17:15) (100/65 - 136/99)  BP(mean): --  RR: 18 (01 Sep 2018 17:15) (16 - 18)  SpO2: 94% (01 Sep 2018 17:15) (94% - 100%)    CAPILLARY BLOOD GLUCOSE        I&O's Summary    31 Aug 2018 07:01  -  01 Sep 2018 07:00  --------------------------------------------------------  IN: 680 mL / OUT: 0 mL / NET: 680 mL        Constitutional: No fever, fatigue  Skin: No rash.  Eyes: No recent vision problems or eye pain.  ENT: No congestion, ear pain, or sore throat.  Cardiovascular: No chest pain or palpation.  Respiratory: No cough, shortness of breath, congestion, or wheezing.  Gastrointestinal: No abdominal pain, nausea, vomiting, or diarrhea.  Genitourinary: No dysuria.  Musculoskeletal: No joint swelling.  Neurologic: No headache.    PHYSICAL EXAM:  GENERAL: NAD  EYES: EOMI, PERRLA  NECK: Supple, No JVD  CHEST/LUNG: cta didier   HEART:  S1 , S2 +  ABDOMEN: soft , bs+  EXTREMITIES:  no edema  NEUROLOGY: alert awake oriented       LABS:                        16.3   9.0   )-----------( 325      ( 31 Aug 2018 23:53 )             47.1     08-31    138  |  103  |  15  ----------------------------<  95  3.5   |  24  |  0.90    Ca    9.4      31 Aug 2018 23:53    TPro  7.7  /  Alb  3.9  /  TBili  0.6  /  DBili  x   /  AST  21  /  ALT  33  /  AlkPhos  67  08-31    PT/INR - ( 31 Aug 2018 08:41 )   PT: 12.3 sec;   INR: 1.13 ratio         PTT - ( 31 Aug 2018 08:41 )  PTT:34.7 sec  CARDIAC MARKERS ( 01 Sep 2018 08:23 )  x     / x     / 395 U/L / x     / 33.7 ng/mL  CARDIAC MARKERS ( 31 Aug 2018 23:53 )  x     / x     / 532 U/L / x     / 49.7 ng/mL  CARDIAC MARKERS ( 31 Aug 2018 14:29 )  10.700 ng/mL / x     / 551 U/L / x     / 55.9 ng/mL  CARDIAC MARKERS ( 31 Aug 2018 08:41 )  .029 ng/mL / x     / x     / x     / 1.0 ng/mL          RADIOLOGY & ADDITIONAL TESTS:    Imaging Personally Reviewed:    Consultant(s) Notes Reviewed:      Care Discussed with Consultants/Other Providers:

## 2018-09-01 NOTE — CONSULT NOTE ADULT - ASSESSMENT
Mr. Miller is a 54 y/o M with anxiety presenting to the ED complaining of bilateral shoulder pain radiating to jaw, found to have troponin elevation, and NSTEMI.  He is s/p cardiac cath and PCI (MIK x 1) to the LCx  He is feeling well this morning without chest pain or difficulty breathing.    - Continue with ASA and Plavix. Can decrease ASA to 81 mg po daily  - Continue with Lipitor 80. Check lipid panel  - No events on telemetry  - CK and troponin leak noted. Would check another set in 6-8 hours  - Check echocardiogram  - Continue with metoprolol 25 mg po q12. Change to Toprol XL at time of d/c  - Watch creatinine and electrolytes.  - Given true NSTEMI, will watch for 24 hours, and plan for d/c home tomorrow.

## 2018-09-01 NOTE — PROGRESS NOTE ADULT - SUBJECTIVE AND OBJECTIVE BOX
Subjective/Objective:  Patient is a 53y old  Male who presents with a chief complaint of cp (31 Aug 2018 20:08)    Allergies    No Known Allergies    Intolerances      Medications:  aspirin enteric coated 325 milliGRAM(s) Oral daily  atorvastatin 80 milliGRAM(s) Oral at bedtime  clopidogrel Tablet 75 milliGRAM(s) Oral daily  FLUoxetine 40 milliGRAM(s) Oral daily  metoprolol tartrate 25 milliGRAM(s) Oral every 12 hours      Review of Systems:   No c/o chest pain or SOB, and all others negative.    Vitals:  T(C): 36.6 (18 @ 05:25), Max: 36.8 (18 @ 15:53)  HR: 57 (18 @ 05:25) (57 - 90)  BP: 113/84 (18 @ 05:25) (107/56 - 158/99)  BP(mean): 109 (18 @ 17:14) (109 - 109)  RR: 17 (18 @ 05:25) (14 - 19)  SpO2: 97% (18 @ 05:25) (94% - 100%)  Wt(kg): --  Daily Height in cm: 170.18 (31 Aug 2018 17:14)    Daily Weight in k.8 (31 Aug 2018 17:14)  I&O's Summary    31 Aug 2018 07:01  -  01 Sep 2018 06:06  --------------------------------------------------------  IN: 680 mL / OUT: 0 mL / NET: 680 mL      Physical Exam:  Appearance: Pt in NAD, non-toxic  Cardiovascular: S1 S2  Cath Site: No evidence of bleeding or hematoma, Non-tender to palpation, 2+ distal pulses  Respiratory: Clear to auscultation bilaterally  Gastrointestinal: Soft, NT/ND, Bowel Sounds +  Neurologic: Non-focal                          16.3   9.0   )-----------( 325      ( 31 Aug 2018 23:53 )             47.1         138  |  103  |  15  ----------------------------<  95  3.5   |  24  |  0.90    Ca    9.4      31 Aug 2018 23:53    TPro  7.7  /  Alb  3.9  /  TBili  0.6  /  DBili  x   /  AST  21  /  ALT  33  /  AlkPhos  67  08-    PT/INR - ( 31 Aug 2018 08:41 )   PT: 12.3 sec;   INR: 1.13 ratio         PTT - ( 31 Aug 2018 08:41 )  PTT:34.7 sec  CARDIAC MARKERS ( 31 Aug 2018 23:53 )  x     / x     / 532 U/L / x     / 49.7 ng/mL  CARDIAC MARKERS ( 31 Aug 2018 14:29 )  10.700 ng/mL / x     / 551 U/L / x     / 55.9 ng/mL  CARDIAC MARKERS ( 31 Aug 2018 08:41 )  .029 ng/mL / x     / x     / x     / 1.0 ng/mL        Lipid panel Total Cholesterol: 223  LDL: 152  HDL: 51  T      Hgb A1c     Interpretation of Telemetry: SB/SR at HR 50-90's.  No special event over night.    Assessment/Plan:   S/P PCI: MIK x1 to LCX via R radial. Pt tolerated procedure well and overnight remained uneventful. No c/o chest pain or SOB. Pt is hemodynamically stable, EKG and all lab results reviewed. Noted low K this morning and KCL supplemented. Insertion/incision site benign, no bleeding or hematoma, and cath site dressing changed. Discharge teaching provided to Pt/caretaker and verbalized understanding the instruction.   F/U with cardiologist in 1-2 weeks.  CE's remains high this morning.  Plan to recheck CE's at 8am then may d/c home if stable.  cont assess and monitor.

## 2018-09-02 VITALS
HEART RATE: 85 BPM | OXYGEN SATURATION: 95 % | SYSTOLIC BLOOD PRESSURE: 114 MMHG | RESPIRATION RATE: 18 BRPM | TEMPERATURE: 98 F | DIASTOLIC BLOOD PRESSURE: 71 MMHG

## 2018-09-02 PROCEDURE — 80048 BASIC METABOLIC PNL TOTAL CA: CPT

## 2018-09-02 PROCEDURE — 99152 MOD SED SAME PHYS/QHP 5/>YRS: CPT

## 2018-09-02 PROCEDURE — C9606: CPT | Mod: LC

## 2018-09-02 PROCEDURE — C1887: CPT

## 2018-09-02 PROCEDURE — 93005 ELECTROCARDIOGRAM TRACING: CPT

## 2018-09-02 PROCEDURE — 99232 SBSQ HOSP IP/OBS MODERATE 35: CPT

## 2018-09-02 PROCEDURE — C1874: CPT

## 2018-09-02 PROCEDURE — 82550 ASSAY OF CK (CPK): CPT

## 2018-09-02 PROCEDURE — C1769: CPT

## 2018-09-02 PROCEDURE — 93458 L HRT ARTERY/VENTRICLE ANGIO: CPT | Mod: 59

## 2018-09-02 PROCEDURE — C8929: CPT

## 2018-09-02 PROCEDURE — 99153 MOD SED SAME PHYS/QHP EA: CPT

## 2018-09-02 PROCEDURE — 85027 COMPLETE CBC AUTOMATED: CPT

## 2018-09-02 PROCEDURE — C1725: CPT

## 2018-09-02 PROCEDURE — 82553 CREATINE MB FRACTION: CPT

## 2018-09-02 PROCEDURE — 80061 LIPID PANEL: CPT

## 2018-09-02 PROCEDURE — C1894: CPT

## 2018-09-02 PROCEDURE — 92941 PRQ TRLML REVSC TOT OCCL AMI: CPT | Mod: LC

## 2018-09-02 RX ORDER — METOPROLOL TARTRATE 50 MG
1 TABLET ORAL
Qty: 30 | Refills: 0
Start: 2018-09-02 | End: 2018-10-01

## 2018-09-02 RX ADMIN — CLOPIDOGREL BISULFATE 75 MILLIGRAM(S): 75 TABLET, FILM COATED ORAL at 11:31

## 2018-09-02 RX ADMIN — Medication 25 MILLIGRAM(S): at 05:34

## 2018-09-02 RX ADMIN — Medication 81 MILLIGRAM(S): at 11:31

## 2018-09-02 NOTE — PROGRESS NOTE ADULT - SUBJECTIVE AND OBJECTIVE BOX
[facility] - Karyn Travis, Pato, Frank, Kiah, Amalia Brown  Office Number:  [phone]    Patient resting comfortably in bed in NAD.  Laying flat with no respiratory distress.  No complaints of chest pain, dyspnea, palpitations, PND, or orthopnea.  He is walking around floor without issue.    ROS: negative unless otherwise mentioned.    Telemetry:  SB 50-60    MEDICATIONS  (STANDING):  aspirin enteric coated 81 milliGRAM(s) Oral daily  atorvastatin 80 milliGRAM(s) Oral at bedtime  clopidogrel Tablet 75 milliGRAM(s) Oral daily  FLUoxetine 40 milliGRAM(s) Oral daily  metoprolol tartrate 25 milliGRAM(s) Oral every 12 hours    MEDICATIONS  (PRN):      Allergies    No Known Allergies    Intolerances        Vital Signs Last 24 Hrs  T(C): 36.8 (02 Sep 2018 05:15), Max: 36.8 (01 Sep 2018 17:15)  T(F): 98.2 (02 Sep 2018 05:15), Max: 98.2 (01 Sep 2018 17:15)  HR: 85 (02 Sep 2018 05:15) (63 - 85)  BP: 114/71 (02 Sep 2018 05:15) (100/65 - 114/71)  BP(mean): --  RR: 18 (02 Sep 2018 05:15) (17 - 18)  SpO2: 95% (02 Sep 2018 05:15) (94% - 97%)    I&O's Summary      ON EXAM:    Constitutional: NAD, alert and oriented x 3  Lungs:  Non-labored, breath sounds are clear bilaterally, No wheezing, rales or rhonchi  Cardiovascular: RRR.  S1 and S2 positive.  No murmurs, rubs, gallops or clicks  Gastrointestinal: Bowel Sounds present, soft, nontender.   Lymph: No peripheral edema. No cervical lymphadenopathy.  Neurological: Alert, no focal deficits  Skin: No rashes or ulcers   Psych:  Mood & affect appropriate.    LABS: All Labs Reviewed:                        16.3   9.0   )-----------( 325      ( 31 Aug 2018 23:53 )             47.1                         16.0   6.62  )-----------( 319      ( 31 Aug 2018 08:41 )             46.1     31 Aug 2018 23:53    138    |  103    |  15     ----------------------------<  95     3.5     |  24     |  0.90   31 Aug 2018 08:41    140    |  105    |  17     ----------------------------<  99     4.1     |  28     |  0.99     Ca    9.4        31 Aug 2018 23:53  Ca    8.5        31 Aug 2018 08:41    TPro  7.7    /  Alb  3.9    /  TBili  0.6    /  DBili  x      /  AST  21     /  ALT  33     /  AlkPhos  67     31 Aug 2018 08:41    PT/INR - ( 31 Aug 2018 08:41 )   PT: 12.3 sec;   INR: 1.13 ratio         PTT - ( 31 Aug 2018 08:41 )  PTT:34.7 sec  CARDIAC MARKERS ( 01 Sep 2018 08:23 )  x     / x     / 395 U/L / x     / 33.7 ng/mL  CARDIAC MARKERS ( 31 Aug 2018 23:53 )  x     / x     / 532 U/L / x     / 49.7 ng/mL  CARDIAC MARKERS ( 31 Aug 2018 14:29 )  10.700 ng/mL / x     / 551 U/L / x     / 55.9 ng/mL  CARDIAC MARKERS ( 31 Aug 2018 08:41 )  .029 ng/mL / x     / x     / x     / 1.0 ng/mL      Blood Culture:

## 2018-09-04 PROBLEM — G47.33 OBSTRUCTIVE SLEEP APNEA (ADULT) (PEDIATRIC): Chronic | Status: ACTIVE | Noted: 2018-08-31

## 2018-09-06 ENCOUNTER — NON-APPOINTMENT (OUTPATIENT)
Age: 53
End: 2018-09-06

## 2018-09-06 ENCOUNTER — APPOINTMENT (OUTPATIENT)
Dept: CARDIOLOGY | Facility: CLINIC | Age: 53
End: 2018-09-06
Payer: COMMERCIAL

## 2018-09-06 VITALS
SYSTOLIC BLOOD PRESSURE: 119 MMHG | HEART RATE: 73 BPM | HEIGHT: 67 IN | WEIGHT: 222 LBS | BODY MASS INDEX: 34.84 KG/M2 | OXYGEN SATURATION: 97 % | DIASTOLIC BLOOD PRESSURE: 76 MMHG

## 2018-09-06 DIAGNOSIS — I25.10 ATHEROSCLEROTIC HEART DISEASE OF NATIVE CORONARY ARTERY W/OUT ANGINA PECTORIS: ICD-10-CM

## 2018-09-06 PROCEDURE — 93000 ELECTROCARDIOGRAM COMPLETE: CPT

## 2018-09-06 PROCEDURE — 99214 OFFICE O/P EST MOD 30 MIN: CPT

## 2018-09-06 RX ORDER — ASPIRIN 81 MG/1
81 TABLET, CHEWABLE ORAL DAILY
Qty: 30 | Refills: 5 | Status: ACTIVE | COMMUNITY
Start: 2018-09-06

## 2018-10-04 ENCOUNTER — APPOINTMENT (OUTPATIENT)
Dept: CARDIOLOGY | Facility: CLINIC | Age: 53
End: 2018-10-04
Payer: COMMERCIAL

## 2018-10-04 VITALS
WEIGHT: 220 LBS | OXYGEN SATURATION: 94 % | BODY MASS INDEX: 34.53 KG/M2 | HEIGHT: 67 IN | DIASTOLIC BLOOD PRESSURE: 69 MMHG | HEART RATE: 54 BPM | SYSTOLIC BLOOD PRESSURE: 104 MMHG

## 2018-10-04 PROCEDURE — 99214 OFFICE O/P EST MOD 30 MIN: CPT

## 2018-10-24 RX ORDER — METOPROLOL SUCCINATE 50 MG/1
50 TABLET, EXTENDED RELEASE ORAL DAILY
Qty: 30 | Refills: 5 | Status: DISCONTINUED | COMMUNITY
Start: 2018-09-06 | End: 2018-10-24

## 2019-01-08 ENCOUNTER — NON-APPOINTMENT (OUTPATIENT)
Age: 54
End: 2019-01-08

## 2019-01-08 ENCOUNTER — APPOINTMENT (OUTPATIENT)
Dept: CARDIOLOGY | Facility: CLINIC | Age: 54
End: 2019-01-08
Payer: COMMERCIAL

## 2019-01-08 VITALS
HEIGHT: 67 IN | DIASTOLIC BLOOD PRESSURE: 71 MMHG | BODY MASS INDEX: 35.47 KG/M2 | WEIGHT: 226 LBS | SYSTOLIC BLOOD PRESSURE: 109 MMHG | HEART RATE: 54 BPM | OXYGEN SATURATION: 95 %

## 2019-01-08 PROCEDURE — 99214 OFFICE O/P EST MOD 30 MIN: CPT

## 2019-01-08 PROCEDURE — 93000 ELECTROCARDIOGRAM COMPLETE: CPT

## 2019-01-08 NOTE — CARDIOLOGY SUMMARY
[___] : [unfilled] [None] : no pulmonary hypertension [Normal] : normal LA size [Mild] : mild mitral regurgitation

## 2019-01-08 NOTE — DISCUSSION/SUMMARY
[___ Month(s)] : [unfilled] month(s) [With Me] : with me [FreeTextEntry1] : Mr. Miller is a 53 year old male who recently presented with an NSTEMI, and is here for follow up evaluation.\par \par He is status post PCI to his LCx. He is feeling well and has been active without issue. He is back to exercise and has been losing weight on his new diet.\par His blood pressure is at goal. His prior EKG is a sinus rhythm with inferior-posterior infarct pattern, unchanged from the hospital EKG. His physical exam is unremarkable. There is no evidence of volume overload or decompensated heart failure. His fatigue is multifactorial, and I have stressed the need for cpap compliance.\par \par For his CAD, he'll continue with aspirin, Plavix, and Lipitor. His lipid panel will be repeated this month, and his lipitor dose will be changed accordingly. He will continue Toprol-XL 50 and Lisinopril 2.5 mg po daily. In the setting of his infarct, I have set him up for a 2d echocardiogram to evaluate his LV function.\par \par I have encouraged him to stay active. We discussed the importance of diet, and exercise.\par \par He will follow up with me in three months. He knows to call if any issues or questions\par \par

## 2019-01-08 NOTE — HISTORY OF PRESENT ILLNESS
[FreeTextEntry1] : Mr. Miller is a 53 year old male who recently presented with an NSTEMI, and is here for follow up evaluation.\par \par On August 31, 2018 he presented to the [place] emergency room with bilateral shoulder pain and chest pain. The pain was initially felt while on a treadmill. He was found to have positive cardiac enzymes, and was transferred to [facility] for cardiac cath.\par He was found to have a 99% lesion of his mid left circumflex and is status post balloon angioplasty and stent. He was also noted to have diffuse mild CAD and a 40% stenosis of his RPLS.\par Echocardiogram demonstrated no significant valvular pathology, with normal left ventricular systolic function.\par His most recent LDL was 152.\par \par I last saw him 10/4/2018\par He is feeling much better. He is back to the gym. He has started on a new diet, has lost weight, and is feeing more energetic.\par He denies shortness of breath, or any pain similar to the pain that brought him to the hospital. His palpitations have resolved. He has no orthopnea, PND, lightheadedness, dizziness or near syncope. He reports very mild fatigue, which seems to be improving overall. He has a history of ANYA, and is not always compliant with his cpap.

## 2019-01-08 NOTE — PHYSICAL EXAM
[General Appearance - Well Developed] : well developed [Normal Appearance] : normal appearance [Well Groomed] : well groomed [General Appearance - Well Nourished] : well nourished [No Deformities] : no deformities [General Appearance - In No Acute Distress] : no acute distress [Normal Conjunctiva] : the conjunctiva exhibited no abnormalities [Eyelids - No Xanthelasma] : the eyelids demonstrated no xanthelasmas [Normal Oral Mucosa] : normal oral mucosa [No Oral Pallor] : no oral pallor [No Oral Cyanosis] : no oral cyanosis [Normal Jugular Venous A Waves Present] : normal jugular venous A waves present [Normal Jugular Venous V Waves Present] : normal jugular venous V waves present [No Jugular Venous Medina A Waves] : no jugular venous medina A waves [Respiration, Rhythm And Depth] : normal respiratory rhythm and effort [Exaggerated Use Of Accessory Muscles For Inspiration] : no accessory muscle use [Auscultation Breath Sounds / Voice Sounds] : lungs were clear to auscultation bilaterally [Abdomen Soft] : soft [Abdomen Tenderness] : non-tender [Abdomen Mass (___ Cm)] : no abdominal mass palpated [Abnormal Walk] : normal gait [Gait - Sufficient For Exercise Testing] : the gait was sufficient for exercise testing [Nail Clubbing] : no clubbing of the fingernails [Cyanosis, Localized] : no localized cyanosis [Petechial Hemorrhages (___cm)] : no petechial hemorrhages [Skin Color & Pigmentation] : normal skin color and pigmentation [] : no rash [No Venous Stasis] : no venous stasis [Skin Lesions] : no skin lesions [No Skin Ulcers] : no skin ulcer [No Xanthoma] : no  xanthoma was observed [Oriented To Time, Place, And Person] : oriented to person, place, and time [Affect] : the affect was normal [Mood] : the mood was normal [No Anxiety] : not feeling anxious [Normal Rate] : normal [Normal S1] : normal S1 [Normal S2] : normal S2 [No Murmur] : no murmurs heard [2+] : left 2+ [No Abnormalities] : the abdominal aorta was not enlarged and no bruit was heard [No Pitting Edema] : no pitting edema present [S3] : no S3 [S4] : no S4 [Right Carotid Bruit] : no bruit heard over the right carotid [Left Carotid Bruit] : no bruit heard over the left carotid [Right Femoral Bruit] : no bruit heard over the right femoral artery [Left Femoral Bruit] : no bruit heard over the left femoral artery

## 2019-02-26 ENCOUNTER — APPOINTMENT (OUTPATIENT)
Dept: CARDIOLOGY | Facility: CLINIC | Age: 54
End: 2019-02-26
Payer: COMMERCIAL

## 2019-02-26 PROCEDURE — 93306 TTE W/DOPPLER COMPLETE: CPT

## 2019-07-24 ENCOUNTER — EMERGENCY (EMERGENCY)
Facility: HOSPITAL | Age: 54
LOS: 1 days | Discharge: HOME | End: 2019-07-24
Attending: EMERGENCY MEDICINE
Payer: COMMERCIAL

## 2019-07-24 VITALS
HEART RATE: 61 BPM | SYSTOLIC BLOOD PRESSURE: 101 MMHG | WEIGHT: 214.95 LBS | RESPIRATION RATE: 19 BRPM | HEIGHT: 67 IN | OXYGEN SATURATION: 98 % | TEMPERATURE: 98 F | DIASTOLIC BLOOD PRESSURE: 66 MMHG

## 2019-07-24 VITALS
TEMPERATURE: 98 F | OXYGEN SATURATION: 98 % | SYSTOLIC BLOOD PRESSURE: 116 MMHG | HEART RATE: 58 BPM | RESPIRATION RATE: 16 BRPM | DIASTOLIC BLOOD PRESSURE: 72 MMHG

## 2019-07-24 DIAGNOSIS — S83.207S UNSPECIFIED TEAR OF UNSPECIFIED MENISCUS, CURRENT INJURY, LEFT KNEE, SEQUELA: Chronic | ICD-10-CM

## 2019-07-24 DIAGNOSIS — Z86.018 PERSONAL HISTORY OF OTHER BENIGN NEOPLASM: Chronic | ICD-10-CM

## 2019-07-24 LAB
ALBUMIN SERPL ELPH-MCNC: 4.2 G/DL — SIGNIFICANT CHANGE UP (ref 3.3–5)
ALP SERPL-CCNC: 78 U/L — SIGNIFICANT CHANGE UP (ref 40–120)
ALT FLD-CCNC: 27 U/L — SIGNIFICANT CHANGE UP (ref 10–45)
ANION GAP SERPL CALC-SCNC: 12 MMOL/L — SIGNIFICANT CHANGE UP (ref 5–17)
APTT BLD: 30.7 SEC — SIGNIFICANT CHANGE UP (ref 27.5–36.3)
AST SERPL-CCNC: 28 U/L — SIGNIFICANT CHANGE UP (ref 10–40)
BASOPHILS # BLD AUTO: 0.1 K/UL — SIGNIFICANT CHANGE UP (ref 0–0.2)
BASOPHILS NFR BLD AUTO: 1 % — SIGNIFICANT CHANGE UP (ref 0–2)
BILIRUB SERPL-MCNC: 0.7 MG/DL — SIGNIFICANT CHANGE UP (ref 0.2–1.2)
BUN SERPL-MCNC: 8 MG/DL — SIGNIFICANT CHANGE UP (ref 7–23)
CALCIUM SERPL-MCNC: 9.7 MG/DL — SIGNIFICANT CHANGE UP (ref 8.4–10.5)
CHLORIDE SERPL-SCNC: 104 MMOL/L — SIGNIFICANT CHANGE UP (ref 96–108)
CO2 SERPL-SCNC: 27 MMOL/L — SIGNIFICANT CHANGE UP (ref 22–31)
CREAT SERPL-MCNC: 0.91 MG/DL — SIGNIFICANT CHANGE UP (ref 0.5–1.3)
EOSINOPHIL # BLD AUTO: 0.3 K/UL — SIGNIFICANT CHANGE UP (ref 0–0.5)
EOSINOPHIL NFR BLD AUTO: 4.8 % — SIGNIFICANT CHANGE UP (ref 0–6)
GLUCOSE SERPL-MCNC: 87 MG/DL — SIGNIFICANT CHANGE UP (ref 70–99)
HCT VFR BLD CALC: 43.6 % — SIGNIFICANT CHANGE UP (ref 39–50)
HGB BLD-MCNC: 15.3 G/DL — SIGNIFICANT CHANGE UP (ref 13–17)
INR BLD: 1.08 RATIO — SIGNIFICANT CHANGE UP (ref 0.88–1.16)
LYMPHOCYTES # BLD AUTO: 2.1 K/UL — SIGNIFICANT CHANGE UP (ref 1–3.3)
LYMPHOCYTES # BLD AUTO: 32 % — SIGNIFICANT CHANGE UP (ref 13–44)
MCHC RBC-ENTMCNC: 32.4 PG — SIGNIFICANT CHANGE UP (ref 27–34)
MCHC RBC-ENTMCNC: 35.1 GM/DL — SIGNIFICANT CHANGE UP (ref 32–36)
MCV RBC AUTO: 92.3 FL — SIGNIFICANT CHANGE UP (ref 80–100)
MONOCYTES # BLD AUTO: 0.6 K/UL — SIGNIFICANT CHANGE UP (ref 0–0.9)
MONOCYTES NFR BLD AUTO: 9.8 % — SIGNIFICANT CHANGE UP (ref 2–14)
NEUTROPHILS # BLD AUTO: 3.4 K/UL — SIGNIFICANT CHANGE UP (ref 1.8–7.4)
NEUTROPHILS NFR BLD AUTO: 52.4 % — SIGNIFICANT CHANGE UP (ref 43–77)
NT-PROBNP SERPL-SCNC: 46 PG/ML — SIGNIFICANT CHANGE UP (ref 0–300)
PLATELET # BLD AUTO: 307 K/UL — SIGNIFICANT CHANGE UP (ref 150–400)
POTASSIUM SERPL-MCNC: 4.7 MMOL/L — SIGNIFICANT CHANGE UP (ref 3.5–5.3)
POTASSIUM SERPL-SCNC: 4.7 MMOL/L — SIGNIFICANT CHANGE UP (ref 3.5–5.3)
PROT SERPL-MCNC: 6.7 G/DL — SIGNIFICANT CHANGE UP (ref 6–8.3)
PROTHROM AB SERPL-ACNC: 12.4 SEC — SIGNIFICANT CHANGE UP (ref 10–12.9)
RBC # BLD: 4.72 M/UL — SIGNIFICANT CHANGE UP (ref 4.2–5.8)
RBC # FLD: 12.4 % — SIGNIFICANT CHANGE UP (ref 10.3–14.5)
SODIUM SERPL-SCNC: 143 MMOL/L — SIGNIFICANT CHANGE UP (ref 135–145)
TROPONIN T, HIGH SENSITIVITY RESULT: <6 NG/L — SIGNIFICANT CHANGE UP (ref 0–51)
WBC # BLD: 6.5 K/UL — SIGNIFICANT CHANGE UP (ref 3.8–10.5)
WBC # FLD AUTO: 6.5 K/UL — SIGNIFICANT CHANGE UP (ref 3.8–10.5)

## 2019-07-24 PROCEDURE — 71046 X-RAY EXAM CHEST 2 VIEWS: CPT

## 2019-07-24 PROCEDURE — 84484 ASSAY OF TROPONIN QUANT: CPT

## 2019-07-24 PROCEDURE — 71046 X-RAY EXAM CHEST 2 VIEWS: CPT | Mod: 26

## 2019-07-24 PROCEDURE — 99284 EMERGENCY DEPT VISIT MOD MDM: CPT | Mod: 25

## 2019-07-24 PROCEDURE — 85610 PROTHROMBIN TIME: CPT

## 2019-07-24 PROCEDURE — 99285 EMERGENCY DEPT VISIT HI MDM: CPT

## 2019-07-24 PROCEDURE — 80053 COMPREHEN METABOLIC PANEL: CPT

## 2019-07-24 PROCEDURE — 93010 ELECTROCARDIOGRAM REPORT: CPT

## 2019-07-24 PROCEDURE — 85730 THROMBOPLASTIN TIME PARTIAL: CPT

## 2019-07-24 PROCEDURE — 85027 COMPLETE CBC AUTOMATED: CPT

## 2019-07-24 PROCEDURE — 93005 ELECTROCARDIOGRAM TRACING: CPT

## 2019-07-24 PROCEDURE — 83880 ASSAY OF NATRIURETIC PEPTIDE: CPT

## 2019-07-24 RX ORDER — ASPIRIN/CALCIUM CARB/MAGNESIUM 324 MG
243 TABLET ORAL ONCE
Refills: 0 | Status: COMPLETED | OUTPATIENT
Start: 2019-07-24 | End: 2019-07-24

## 2019-07-24 RX ADMIN — Medication 243 MILLIGRAM(S): at 17:25

## 2019-07-24 NOTE — ED ADULT NURSE NOTE - NSIMPLEMENTINTERV_GEN_ALL_ED
Implemented All Fall with Harm Risk Interventions:  [place] to call system. Call bell, personal items and telephone within reach. Instruct patient to call for assistance. Room bathroom lighting operational. Non-slip footwear when patient is off stretcher. Physically safe environment: no spills, clutter or unnecessary equipment. Stretcher in lowest position, wheels locked, appropriate side rails in place. Provide visual cue, wrist band, yellow gown, etc. Monitor gait and stability. Monitor for mental status changes and reorient to person, place, and time. Review medications for side effects contributing to fall risk. Reinforce activity limits and safety measures with patient and family. Provide visual clues: red socks.

## 2019-07-24 NOTE — ED PROVIDER NOTE - ATTENDING CONTRIBUTION TO CARE
Agree with above, Jr Youngblood MD, FACEP  Troponin within normal limits, cardiology recommendations for patient to be seen in office.   Patient serially evaluated throughout emergency department course. There was no acute deterioration up to this time in the department. Patient has demonstrated marked clinical improvement, feels better at this time according to emergency department team. Agree with goals/plan of emergency department care as described in electronic medical record, including diagnostics, therapeutics and consultation as clinically warranted. Will discharge home with close outpatient followup with primary care physician/provider and specialist if necessary. Patient educated on concerning signs and features to return to the emergency department, in layman terms, including but not limited to: worsening jaw pain, exertional pain, lightheadedness, nausea, vomiting, fever, chills, persistent/worsening symptoms or any concerns at all. No immediate life threatening issues present on history or clinical exam. Patient is a safe disposition home, has capacity and insight into their condition, is ambulatory in the Emergency Department with no further questions and will follow up with their doctor(s) this week. Patient and family understand anticipatory guidance were given strict return and follow up precautions.  The patient and family have been informed of all concerning signs and symptoms to return to Emergency Department, the necessity to follow up with the PMD/Clinic/follow up provided within 2-3 days was explained, and the patient and/or family reports understanding of above with capacity and insight.

## 2019-07-24 NOTE — ED ADULT NURSE NOTE - CHPI ED NUR SYMPTOMS NEG
no congestion/no fever/no syncope/no chest pain/no chills/no back pain/no nausea/no vomiting/no diaphoresis/no dizziness/no shortness of breath

## 2019-07-24 NOTE — ED ADULT NURSE NOTE - OBJECTIVE STATEMENT
54M with PMH MI with stents 11 months ago comes to ED c/o left sided jaw pain x6 hours. He states pain started when he was at rest. Patient called cardiologist who suggested patient come to ED. Patient states pain is similar to his previous MI in the past. States pain is sharp, made worse with biting down, and not relieved with advil. He is a&ox3 in no acuet distress. He denies N/V/D/dizziness/abdominal pain/SOB/chest pain/back pain/shoulder pain. Patient sinus kurt in 50s on cardiac monitor, abdomen soft, clear lungs, no edema. EKG performed in triage, and patient placed on cardiac monitor. Will continue to monitor.

## 2019-07-24 NOTE — ED PROVIDER NOTE - CLINICAL SUMMARY MEDICAL DECISION MAKING FREE TEXT BOX
Patient with jaw pain consistent with last time he had an NSTEMI over 6 hours in duration, persistent decrescendo. In this physician's medical judgement based on clinical history and physical exam, will get iv, cbc, cmp, ce, ekg within normal limits and no change from prior, discussed with Dr. Marta Holland who states patient to be seen in office tomorrow or the next day. Patient without pain now, educated about plan with family. Patient with capacity and insight into situation, treatment, risks, benefits, alternative therapies, and understands they can ask any questions if needed.

## 2019-07-24 NOTE — ED PROVIDER NOTE - CARE PROVIDERS DIRECT ADDRESSES
,[email or web address]

## 2019-07-24 NOTE — ED PROVIDER NOTE - OBJECTIVE STATEMENT
53 y/o male hx of CAD with stent, smokes cigars, who presents to the ED with jaw pain. patient states he had stent placed 11 months ago for similar jaw pain, patient did not have chest pain or sob at that time but did have shoulder pain. Today he contacted his cardiologist Dr. Holland and was advised to come right in. patient was sitting at work when pain started. states it has been constant, but is worse with chewing. unable to characterize how the pain feels, minimal relief with advil. took asa 81 and plavix today. no chest pain/sob. no fever/chills/cough/congestion/recent illness/dental pain/n/v. prior to today patient was in usual state of health.

## 2019-07-24 NOTE — ED PROVIDER NOTE - NSFOLLOWUPINSTRUCTIONS_ED_ALL_ED_FT
1. Rest. take aspirin and plavix as prescribed  2. Follow up with your cardiologist in 1-2 days with copies of your results.   3. Return to ER for new or worsened chest pain, difficulty breathing, palpitations, dizziness, drenching sweats, fever, passing out or any concern.

## 2019-07-24 NOTE — ED PROVIDER NOTE - CARE PROVIDER_API CALL
Reji Marie)  Cardiovascular Disease  [address] [place] [address]  Phone: [phone]  Fax: [phone]  Follow Up Time:     Gibson Holland)  Internal Medicine  [address] [place] [address] [id number]  Phone: [phone]  Fax: [phone]  Follow Up Time:

## 2019-07-24 NOTE — ED PROVIDER NOTE - PROGRESS NOTE DETAILS
spoke with Dr. pineda covering for Dr Holland, feels testing would be low yield at this time, states can follow up win the office for outpatient testing - Jesusita Stearns PA-C

## 2019-07-26 PROBLEM — I25.10 ATHEROSCLEROTIC HEART DISEASE OF NATIVE CORONARY ARTERY WITHOUT ANGINA PECTORIS: Chronic | Status: ACTIVE | Noted: 2019-07-24

## 2019-08-14 ENCOUNTER — APPOINTMENT (OUTPATIENT)
Dept: CARDIOLOGY | Facility: CLINIC | Age: 54
End: 2019-08-14
Payer: COMMERCIAL

## 2019-08-14 PROCEDURE — A9500: CPT

## 2019-08-14 PROCEDURE — 93015 CV STRESS TEST SUPVJ I&R: CPT

## 2019-08-14 PROCEDURE — 78452 HT MUSCLE IMAGE SPECT MULT: CPT

## 2019-10-04 ENCOUNTER — RX RENEWAL (OUTPATIENT)
Age: 54
End: 2019-10-04

## 2019-10-07 ENCOUNTER — MEDICATION RENEWAL (OUTPATIENT)
Age: 54
End: 2019-10-07

## 2019-10-15 ENCOUNTER — RX RENEWAL (OUTPATIENT)
Age: 54
End: 2019-10-15

## 2019-10-24 ENCOUNTER — APPOINTMENT (OUTPATIENT)
Dept: CARDIOLOGY | Facility: CLINIC | Age: 54
End: 2019-10-24
Payer: COMMERCIAL

## 2019-10-24 ENCOUNTER — NON-APPOINTMENT (OUTPATIENT)
Age: 54
End: 2019-10-24

## 2019-10-24 VITALS
BODY MASS INDEX: 35.16 KG/M2 | HEART RATE: 51 BPM | DIASTOLIC BLOOD PRESSURE: 67 MMHG | SYSTOLIC BLOOD PRESSURE: 105 MMHG | HEIGHT: 67 IN | WEIGHT: 224 LBS | OXYGEN SATURATION: 97 %

## 2019-10-24 DIAGNOSIS — R07.9 CHEST PAIN, UNSPECIFIED: ICD-10-CM

## 2019-10-24 PROCEDURE — 93000 ELECTROCARDIOGRAM COMPLETE: CPT

## 2019-10-24 PROCEDURE — 99214 OFFICE O/P EST MOD 30 MIN: CPT

## 2019-10-24 NOTE — HISTORY OF PRESENT ILLNESS
[FreeTextEntry1] : Mr. Miller is a 54 year old male here for follow up evaluation.\par \par On August 31, 2018 he presented to the [place] emergency room with bilateral shoulder pain and chest pain. The pain was initially felt while on a treadmill. He was found to have positive cardiac enzymes, and was transferred to [facility] for cardiac cath.\par He was found to have a 99% lesion of his mid left circumflex and is status post balloon angioplasty and stent. He was also noted to have diffuse mild CAD and a 40% stenosis of his RPLS.\par Echocardiogram at this time demonstrated no significant valvular pathology, with normal left ventricular systolic function. He continues to have normal LV function as of 2/2019.\par \par \par I last saw him 1/2019\par He is feeling much better. He is back to the gym. He has started on a new diet, has lost weight, and is feeing more energetic. He does report some fatigue.\par He had a single episode of atypical chest pain in 8/2019, and had an exercise nuclear stress test that was unremarkable. \par He denies shortness of breath, or any pain similar to the pain that brought him to the hospital. His palpitations have resolved. He has no orthopnea, PND, lightheadedness, dizziness or near syncope.

## 2019-10-24 NOTE — DISCUSSION/SUMMARY
[___ Month(s)] : [unfilled] month(s) [With Me] : with me [FreeTextEntry1] : Mr. Miller is a 54 year old male who has an NSTEMI in 2018, and is here for follow up evaluation.\par \par He is status post PCI to his LCx. He is feeling well and has been active without issue. He is back to exercise and has been losing weight on his new diet.\par His blood pressure is at goal. His prior EKG is a sinus bradycardia with inferior-posterior infarct pattern, unchanged from the hospital EKG. His physical exam is unremarkable. There is no evidence of volume overload or decompensated heart failure. \par \par For his CAD, he'll continue with aspirin and Plavix, despite being one year out (he did have residual CAD on cath). He is on lipitor 80; his lipid panel is to be checked this month and his lipitor dose will be changed accordingly (last ldl <50). \par He will continue Lisinopril 2.5. I have decreased his Toprol XL to 25 at night, in the setting of some fatigue.\par \par I have encouraged him to stay active. We discussed the importance of diet, and exercise.\par \par He will follow up with me in 6 months. He knows to call if any issues or questions\par \par

## 2019-10-24 NOTE — PHYSICAL EXAM
[General Appearance - Well Developed] : well developed [Normal Appearance] : normal appearance [Well Groomed] : well groomed [General Appearance - Well Nourished] : well nourished [No Deformities] : no deformities [General Appearance - In No Acute Distress] : no acute distress [Normal Conjunctiva] : the conjunctiva exhibited no abnormalities [Normal Oral Mucosa] : normal oral mucosa [Eyelids - No Xanthelasma] : the eyelids demonstrated no xanthelasmas [No Oral Pallor] : no oral pallor [No Oral Cyanosis] : no oral cyanosis [Normal Jugular Venous A Waves Present] : normal jugular venous A waves present [No Jugular Venous Medina A Waves] : no jugular venous medina A waves [Normal Jugular Venous V Waves Present] : normal jugular venous V waves present [Exaggerated Use Of Accessory Muscles For Inspiration] : no accessory muscle use [Respiration, Rhythm And Depth] : normal respiratory rhythm and effort [Auscultation Breath Sounds / Voice Sounds] : lungs were clear to auscultation bilaterally [Abdomen Tenderness] : non-tender [Abdomen Soft] : soft [Abdomen Mass (___ Cm)] : no abdominal mass palpated [Gait - Sufficient For Exercise Testing] : the gait was sufficient for exercise testing [Abnormal Walk] : normal gait [Nail Clubbing] : no clubbing of the fingernails [Cyanosis, Localized] : no localized cyanosis [Petechial Hemorrhages (___cm)] : no petechial hemorrhages [] : no ischemic changes [Skin Color & Pigmentation] : normal skin color and pigmentation [No Skin Ulcers] : no skin ulcer [Skin Lesions] : no skin lesions [No Venous Stasis] : no venous stasis [Affect] : the affect was normal [No Xanthoma] : no  xanthoma was observed [Oriented To Time, Place, And Person] : oriented to person, place, and time [Mood] : the mood was normal [Normal Rate] : normal [No Anxiety] : not feeling anxious [Normal S2] : normal S2 [Normal S1] : normal S1 [No Murmur] : no murmurs heard [2+] : right 2+ [No Abnormalities] : the abdominal aorta was not enlarged and no bruit was heard [No Pitting Edema] : no pitting edema present [S3] : no S3 [S4] : no S4 [Right Carotid Bruit] : no bruit heard over the right carotid [Left Carotid Bruit] : no bruit heard over the left carotid [Right Femoral Bruit] : no bruit heard over the right femoral artery [Left Femoral Bruit] : no bruit heard over the left femoral artery

## 2019-10-25 ENCOUNTER — RX RENEWAL (OUTPATIENT)
Age: 54
End: 2019-10-25

## 2019-11-06 NOTE — ED PROVIDER NOTE - NS ED MD EM SELECTION
You can access the FollowMyHealth Patient Portal offered by [facility] by registering at the following website: [email or web address]. By joining [employer]’s FollowMyHealth portal, you will also be able to view your health information using other applications (apps) compatible with our system.
[id number] Comprehensive

## 2019-12-01 ENCOUNTER — RX RENEWAL (OUTPATIENT)
Age: 54
End: 2019-12-01

## 2020-02-28 ENCOUNTER — RX RENEWAL (OUTPATIENT)
Age: 55
End: 2020-02-28

## 2020-03-14 ENCOUNTER — RX RENEWAL (OUTPATIENT)
Age: 55
End: 2020-03-14

## 2020-11-19 ENCOUNTER — NON-APPOINTMENT (OUTPATIENT)
Age: 55
End: 2020-11-19

## 2020-11-19 ENCOUNTER — APPOINTMENT (OUTPATIENT)
Dept: CARDIOLOGY | Facility: CLINIC | Age: 55
End: 2020-11-19
Payer: COMMERCIAL

## 2020-11-19 VITALS
DIASTOLIC BLOOD PRESSURE: 68 MMHG | WEIGHT: 236 LBS | HEART RATE: 54 BPM | OXYGEN SATURATION: 98 % | SYSTOLIC BLOOD PRESSURE: 109 MMHG | HEIGHT: 67 IN | BODY MASS INDEX: 37.04 KG/M2

## 2020-11-19 DIAGNOSIS — R94.31 ABNORMAL ELECTROCARDIOGRAM [ECG] [EKG]: ICD-10-CM

## 2020-11-19 PROCEDURE — 93000 ELECTROCARDIOGRAM COMPLETE: CPT

## 2020-11-19 PROCEDURE — 99214 OFFICE O/P EST MOD 30 MIN: CPT

## 2020-11-19 NOTE — PHYSICAL EXAM
[General Appearance - Well Developed] : well developed [Normal Appearance] : normal appearance [Well Groomed] : well groomed [General Appearance - Well Nourished] : well nourished [No Deformities] : no deformities [General Appearance - In No Acute Distress] : no acute distress [Normal Conjunctiva] : the conjunctiva exhibited no abnormalities [Eyelids - No Xanthelasma] : the eyelids demonstrated no xanthelasmas [Normal Oral Mucosa] : normal oral mucosa [No Oral Pallor] : no oral pallor [No Oral Cyanosis] : no oral cyanosis [Normal Jugular Venous A Waves Present] : normal jugular venous A waves present [Normal Jugular Venous V Waves Present] : normal jugular venous V waves present [No Jugular Venous Medina A Waves] : no jugular venous medina A waves [Respiration, Rhythm And Depth] : normal respiratory rhythm and effort [Exaggerated Use Of Accessory Muscles For Inspiration] : no accessory muscle use [Auscultation Breath Sounds / Voice Sounds] : lungs were clear to auscultation bilaterally [Abdomen Soft] : soft [Abdomen Tenderness] : non-tender [Abdomen Mass (___ Cm)] : no abdominal mass palpated [Abnormal Walk] : normal gait [Gait - Sufficient For Exercise Testing] : the gait was sufficient for exercise testing [Nail Clubbing] : no clubbing of the fingernails [Cyanosis, Localized] : no localized cyanosis [Petechial Hemorrhages (___cm)] : no petechial hemorrhages [Skin Color & Pigmentation] : normal skin color and pigmentation [] : no rash [No Venous Stasis] : no venous stasis [Skin Lesions] : no skin lesions [No Skin Ulcers] : no skin ulcer [No Xanthoma] : no  xanthoma was observed [Oriented To Time, Place, And Person] : oriented to person, place, and time [Affect] : the affect was normal [Mood] : the mood was normal [No Anxiety] : not feeling anxious [Normal Rate] : normal [Normal S1] : normal S1 [Normal S2] : normal S2 [S3] : no S3 [S4] : no S4 [No Murmur] : no murmurs heard [Right Carotid Bruit] : no bruit heard over the right carotid [Left Carotid Bruit] : no bruit heard over the left carotid [Right Femoral Bruit] : no bruit heard over the right femoral artery [Left Femoral Bruit] : no bruit heard over the left femoral artery [2+] : left 2+ [No Abnormalities] : the abdominal aorta was not enlarged and no bruit was heard [No Pitting Edema] : no pitting edema present

## 2021-01-26 ENCOUNTER — APPOINTMENT (OUTPATIENT)
Dept: CARDIOLOGY | Facility: CLINIC | Age: 56
End: 2021-01-26
Payer: COMMERCIAL

## 2021-01-26 ENCOUNTER — NON-APPOINTMENT (OUTPATIENT)
Age: 56
End: 2021-01-26

## 2021-01-26 VITALS
HEIGHT: 67 IN | BODY MASS INDEX: 38.3 KG/M2 | OXYGEN SATURATION: 97 % | WEIGHT: 244 LBS | SYSTOLIC BLOOD PRESSURE: 117 MMHG | HEART RATE: 60 BPM | DIASTOLIC BLOOD PRESSURE: 72 MMHG

## 2021-01-26 DIAGNOSIS — I25.10 ATHEROSCLEROTIC HEART DISEASE OF NATIVE CORONARY ARTERY W/OUT ANGINA PECTORIS: ICD-10-CM

## 2021-01-26 PROCEDURE — 99214 OFFICE O/P EST MOD 30 MIN: CPT

## 2021-01-26 PROCEDURE — 93000 ELECTROCARDIOGRAM COMPLETE: CPT | Mod: NC

## 2021-01-26 PROCEDURE — 99072 ADDL SUPL MATRL&STAF TM PHE: CPT

## 2021-01-26 NOTE — DISCUSSION/SUMMARY
[___ Month(s)] : [unfilled] month(s) [With Me] : with me [FreeTextEntry1] : Mr. Miller is a 55 year old male who has an NSTEMI in 2018, and is here for follow up evaluation.\par \par He is status post PCI to his LCx in 2018. He is feeling well and has been active without issue. He is back to exercise and needs to get back to losing weight.\par His blood pressure is at goal. His prior EKG is a sinus bradycardia without worrisome findings. His physical exam is unremarkable. There is no evidence of volume overload or decompensated heart failure. \par \par For his CAD, he'll continue with aspirin. He can stop his Plavix now that he is >1 year post PCI. He is on lipitor 80 and his LDL is at goal\par He will continue Lisinopril 2.5 and Toprol XL.\par \par I have encouraged him to stay active. We discussed the importance of diet, and exercise.\par There is no cardiac contraindication to proceeding with colonoscopy. Routine cardiac monitoring is recommended.\par He will follow up with me in 4-6 months. He knows to call if any issues or questions\par \par

## 2021-01-26 NOTE — HISTORY OF PRESENT ILLNESS
[FreeTextEntry1] : Mr. Miller is a 55 year old male here for follow up evaluation.\par \par On August 31, 2018 he presented to the [place] emergency room with bilateral shoulder pain and chest pain. The pain was initially felt while on a treadmill. He was found to have positive cardiac enzymes, and was transferred to [facility] for cardiac cath.\par He was found to have a 99% lesion of his mid left circumflex and is status post balloon angioplasty and stent. He was also noted to have diffuse mild CAD and a 40% stenosis of his RPLS.\par Echocardiogram at this time demonstrated no significant valvular pathology, with normal left ventricular systolic function. He continues to have normal LV function as of 2/2019.\par \par I last saw him in 11/2020.\par He is feeling much better. He has no significant chest pain or dyspnea.\par He had a single episode of atypical chest pain in 8/2019, and had an exercise nuclear stress test that was unremarkable. \par He denies shortness of breath, or any pain similar to the pain that brought him to the hospital. His palpitations have resolved. He has no orthopnea, PND, lightheadedness, dizziness or near syncope.\par He is requesting cardiac clearance prior to colonoscopy.

## 2021-05-21 ENCOUNTER — APPOINTMENT (OUTPATIENT)
Dept: CARDIOLOGY | Facility: CLINIC | Age: 56
End: 2021-05-21

## 2021-08-10 ENCOUNTER — RX RENEWAL (OUTPATIENT)
Age: 56
End: 2021-08-10

## 2021-12-16 ENCOUNTER — NON-APPOINTMENT (OUTPATIENT)
Age: 56
End: 2021-12-16

## 2021-12-20 ENCOUNTER — EMERGENCY (EMERGENCY)
Facility: HOSPITAL | Age: 56
LOS: 1 days | Discharge: HOME | End: 2021-12-20
Attending: EMERGENCY MEDICINE | Admitting: EMERGENCY MEDICINE
Payer: COMMERCIAL

## 2021-12-20 VITALS
TEMPERATURE: 98 F | SYSTOLIC BLOOD PRESSURE: 122 MMHG | HEART RATE: 54 BPM | OXYGEN SATURATION: 99 % | RESPIRATION RATE: 18 BRPM | WEIGHT: 229.94 LBS | HEIGHT: 67 IN | DIASTOLIC BLOOD PRESSURE: 75 MMHG

## 2021-12-20 DIAGNOSIS — S83.207S UNSPECIFIED TEAR OF UNSPECIFIED MENISCUS, CURRENT INJURY, LEFT KNEE, SEQUELA: Chronic | ICD-10-CM

## 2021-12-20 DIAGNOSIS — Z86.018 PERSONAL HISTORY OF OTHER BENIGN NEOPLASM: Chronic | ICD-10-CM

## 2021-12-20 PROCEDURE — 99285 EMERGENCY DEPT VISIT HI MDM: CPT

## 2021-12-20 RX ORDER — FLUOXETINE HCL 10 MG
1 CAPSULE ORAL
Qty: 0 | Refills: 0 | DISCHARGE

## 2021-12-20 NOTE — ED ADULT NURSE NOTE - OBJECTIVE STATEMENT
pt to ED with c/o persistent throbbing pain behind right knee, redness and swelling to calf  x yesterday. denies hx of DVT. Denies trauma to area.

## 2021-12-21 VITALS
OXYGEN SATURATION: 98 % | DIASTOLIC BLOOD PRESSURE: 68 MMHG | RESPIRATION RATE: 19 BRPM | SYSTOLIC BLOOD PRESSURE: 124 MMHG | HEART RATE: 62 BPM

## 2021-12-21 LAB
ALBUMIN SERPL ELPH-MCNC: 3.4 G/DL — SIGNIFICANT CHANGE UP (ref 3.3–5)
ALP SERPL-CCNC: 87 U/L — SIGNIFICANT CHANGE UP (ref 40–120)
ALT FLD-CCNC: 28 U/L — SIGNIFICANT CHANGE UP (ref 12–78)
ANION GAP SERPL CALC-SCNC: 6 MMOL/L — SIGNIFICANT CHANGE UP (ref 5–17)
APTT BLD: 32.9 SEC — SIGNIFICANT CHANGE UP (ref 27.5–35.5)
AST SERPL-CCNC: 21 U/L — SIGNIFICANT CHANGE UP (ref 15–37)
BASOPHILS # BLD AUTO: 0.11 K/UL — SIGNIFICANT CHANGE UP (ref 0–0.2)
BASOPHILS NFR BLD AUTO: 1.4 % — SIGNIFICANT CHANGE UP (ref 0–2)
BILIRUB SERPL-MCNC: 0.7 MG/DL — SIGNIFICANT CHANGE UP (ref 0.2–1.2)
BUN SERPL-MCNC: 11 MG/DL — SIGNIFICANT CHANGE UP (ref 7–23)
CALCIUM SERPL-MCNC: 8.3 MG/DL — LOW (ref 8.5–10.1)
CHLORIDE SERPL-SCNC: 110 MMOL/L — HIGH (ref 96–108)
CO2 SERPL-SCNC: 26 MMOL/L — SIGNIFICANT CHANGE UP (ref 22–31)
CREAT SERPL-MCNC: 0.92 MG/DL — SIGNIFICANT CHANGE UP (ref 0.5–1.3)
EOSINOPHIL # BLD AUTO: 0.45 K/UL — SIGNIFICANT CHANGE UP (ref 0–0.5)
EOSINOPHIL NFR BLD AUTO: 5.7 % — SIGNIFICANT CHANGE UP (ref 0–6)
GLUCOSE SERPL-MCNC: 93 MG/DL — SIGNIFICANT CHANGE UP (ref 70–99)
HCT VFR BLD CALC: 41.3 % — SIGNIFICANT CHANGE UP (ref 39–50)
HGB BLD-MCNC: 14.5 G/DL — SIGNIFICANT CHANGE UP (ref 13–17)
IMM GRANULOCYTES NFR BLD AUTO: 0.4 % — SIGNIFICANT CHANGE UP (ref 0–1.5)
INR BLD: 1.23 RATIO — HIGH (ref 0.88–1.16)
LYMPHOCYTES # BLD AUTO: 2.51 K/UL — SIGNIFICANT CHANGE UP (ref 1–3.3)
LYMPHOCYTES # BLD AUTO: 32.1 % — SIGNIFICANT CHANGE UP (ref 13–44)
MCHC RBC-ENTMCNC: 31.2 PG — SIGNIFICANT CHANGE UP (ref 27–34)
MCHC RBC-ENTMCNC: 35.1 GM/DL — SIGNIFICANT CHANGE UP (ref 32–36)
MCV RBC AUTO: 88.8 FL — SIGNIFICANT CHANGE UP (ref 80–100)
MONOCYTES # BLD AUTO: 0.98 K/UL — HIGH (ref 0–0.9)
MONOCYTES NFR BLD AUTO: 12.5 % — SIGNIFICANT CHANGE UP (ref 2–14)
NEUTROPHILS # BLD AUTO: 3.75 K/UL — SIGNIFICANT CHANGE UP (ref 1.8–7.4)
NEUTROPHILS NFR BLD AUTO: 47.9 % — SIGNIFICANT CHANGE UP (ref 43–77)
NRBC # BLD: 0 /100 WBCS — SIGNIFICANT CHANGE UP (ref 0–0)
PLATELET # BLD AUTO: 327 K/UL — SIGNIFICANT CHANGE UP (ref 150–400)
POTASSIUM SERPL-MCNC: 4.1 MMOL/L — SIGNIFICANT CHANGE UP (ref 3.5–5.3)
POTASSIUM SERPL-SCNC: 4.1 MMOL/L — SIGNIFICANT CHANGE UP (ref 3.5–5.3)
PROT SERPL-MCNC: 6.8 G/DL — SIGNIFICANT CHANGE UP (ref 6–8.3)
PROTHROM AB SERPL-ACNC: 14.3 SEC — HIGH (ref 10.6–13.6)
RBC # BLD: 4.65 M/UL — SIGNIFICANT CHANGE UP (ref 4.2–5.8)
RBC # FLD: 13.2 % — SIGNIFICANT CHANGE UP (ref 10.3–14.5)
SODIUM SERPL-SCNC: 142 MMOL/L — SIGNIFICANT CHANGE UP (ref 135–145)
WBC # BLD: 7.83 K/UL — SIGNIFICANT CHANGE UP (ref 3.8–10.5)
WBC # FLD AUTO: 7.83 K/UL — SIGNIFICANT CHANGE UP (ref 3.8–10.5)

## 2021-12-21 PROCEDURE — 85025 COMPLETE CBC W/AUTO DIFF WBC: CPT

## 2021-12-21 PROCEDURE — 80053 COMPREHEN METABOLIC PANEL: CPT

## 2021-12-21 PROCEDURE — 93970 EXTREMITY STUDY: CPT | Mod: 26

## 2021-12-21 PROCEDURE — 93970 EXTREMITY STUDY: CPT

## 2021-12-21 PROCEDURE — 99284 EMERGENCY DEPT VISIT MOD MDM: CPT | Mod: 25

## 2021-12-21 PROCEDURE — 85730 THROMBOPLASTIN TIME PARTIAL: CPT

## 2021-12-21 PROCEDURE — 36415 COLL VENOUS BLD VENIPUNCTURE: CPT

## 2021-12-21 PROCEDURE — 85610 PROTHROMBIN TIME: CPT

## 2021-12-21 NOTE — ED PROVIDER NOTE - OBJECTIVE STATEMENT
pt c/o pain behind right knee since yesterday, then noted rle edema today. no fevers, chills, trauma, weakness, numbness, cp, sob, abd pain, calf pain, hx of dvt/pe, travel. pt declining pain meds.  pmd - edenilson

## 2021-12-21 NOTE — ED PROVIDER NOTE - CARE PROVIDER_API CALL
Carlos Solano (DO)  Family Medicine  [address] [place] [address], [address]  [place] [address]  Phone: [phone]  Fax: [phone]  Follow Up Time: 1-3 Days

## 2021-12-21 NOTE — ED PROVIDER NOTE - PATIENT PORTAL LINK FT
You can access the FollowMyHealth Patient Portal offered by [facility] by registering at the following website: [email or web address]. By joining [employer]’s FollowMyHealth portal, you will also be able to view your health information using other applications (apps) compatible with our system.

## 2021-12-21 NOTE — ED PROVIDER NOTE - NSICDXFAMILYHX_GEN_ALL_CORE_FT
FAMILY HISTORY:  Grandparent  Still living? No  Family history of heart disease, Age at diagnosis: Age Unknown

## 2022-01-03 ENCOUNTER — APPOINTMENT (OUTPATIENT)
Dept: CARDIOLOGY | Facility: CLINIC | Age: 57
End: 2022-01-03
Payer: COMMERCIAL

## 2022-01-03 ENCOUNTER — NON-APPOINTMENT (OUTPATIENT)
Age: 57
End: 2022-01-03

## 2022-01-03 VITALS
WEIGHT: 236 LBS | HEART RATE: 60 BPM | SYSTOLIC BLOOD PRESSURE: 105 MMHG | OXYGEN SATURATION: 96 % | HEIGHT: 67 IN | DIASTOLIC BLOOD PRESSURE: 71 MMHG | BODY MASS INDEX: 37.04 KG/M2

## 2022-01-03 DIAGNOSIS — R94.31 ABNORMAL ELECTROCARDIOGRAM [ECG] [EKG]: ICD-10-CM

## 2022-01-03 PROCEDURE — 99214 OFFICE O/P EST MOD 30 MIN: CPT

## 2022-01-03 PROCEDURE — 93000 ELECTROCARDIOGRAM COMPLETE: CPT

## 2022-01-03 RX ORDER — CHLORHEXIDINE GLUCONATE, 0.12% ORAL RINSE 1.2 MG/ML
0.12 SOLUTION DENTAL
Qty: 473 | Refills: 0 | Status: ACTIVE | COMMUNITY
Start: 2021-10-22

## 2022-01-03 RX ORDER — ETODOLAC 400 MG/1
400 TABLET, FILM COATED ORAL
Qty: 14 | Refills: 0 | Status: ACTIVE | COMMUNITY
Start: 2021-10-22

## 2022-01-03 RX ORDER — HYDROCORTISONE 25 MG/G
2.5 CREAM TOPICAL
Qty: 30 | Refills: 0 | Status: ACTIVE | COMMUNITY
Start: 2021-09-02

## 2022-01-03 RX ORDER — TADALAFIL 5 MG/1
5 TABLET ORAL
Qty: 90 | Refills: 0 | Status: ACTIVE | COMMUNITY
Start: 2021-09-09

## 2022-01-03 RX ORDER — CLOPIDOGREL BISULFATE 75 MG/1
75 TABLET, FILM COATED ORAL
Qty: 30 | Refills: 5 | Status: DISCONTINUED | COMMUNITY
Start: 2018-09-06 | End: 2022-01-03

## 2022-01-03 RX ORDER — AMOXICILLIN 500 MG/1
500 CAPSULE ORAL
Qty: 21 | Refills: 0 | Status: ACTIVE | COMMUNITY
Start: 2021-10-22

## 2022-01-03 NOTE — HISTORY OF PRESENT ILLNESS
[FreeTextEntry1] : Mr. Miller is a 56 year old male here for follow up evaluation.\par \par On August 31, 2018 he presented to the [place] emergency room with bilateral shoulder pain and chest pain. The pain was initially felt while on a treadmill. He was found to have positive cardiac enzymes, and was transferred to [facility] for cardiac cath.\par He was found to have a 99% lesion of his mid left circumflex and is status post balloon angioplasty and stent. He was also noted to have diffuse mild CAD and a 40% stenosis of his RPLS.\par Echocardiogram at this time demonstrated no significant valvular pathology, with normal left ventricular systolic function. He continues to have normal LV function as of 2/2019.\par \par I last saw him in 1/21.\par \par He is feeling well. He has no chest pain or dyspnea. \par He had a single episode of atypical chest pain in 8/2019, and had an exercise nuclear stress test that was unremarkable. \par His main issue today is mild le edema. It is worse at the end of the day and better in morning. I gave him a low dose Lasix last week, and it seems to have helped\par He has no orthopnea, PND, lightheadedness, dizziness or near syncope.

## 2022-01-03 NOTE — DISCUSSION/SUMMARY
[With Me] : with me [___ Month(s)] : in [unfilled] month(s) [FreeTextEntry1] : Mr. Miller is a 56 year old male who has an NSTEMI in 2018, and is here for follow up evaluation.\par \par He is status post PCI to his LCx in 2018. He is feeling well and has been active without issue.\par He reports mild edema, which is worse at the end of the day, and seems to have improved with diuretics.\par \par His blood pressure is at goal. His prior EKG is a sinus bradycardia without worrisome findings. His physical exam is unremarkable. There is no evidence of volume overload or decompensated heart failure. \par \par For his CAD, he'll continue with aspirin. He is on lipitor 80 and his LDL is at goal\par He will continue Lisinopril 2.5 and Toprol XL. He will continue taking Lasix on an as needed basis, and will keep his legs elevated. He will have a repeat echocardiogram this year.\par \par I have encouraged him to stay active. We discussed the importance of diet, and exercise.\par He will follow up with me in 4-6 months. He knows to call if any issues or questions

## 2022-01-11 ENCOUNTER — APPOINTMENT (OUTPATIENT)
Dept: CARDIOLOGY | Facility: CLINIC | Age: 57
End: 2022-01-11
Payer: COMMERCIAL

## 2022-01-11 PROCEDURE — 93306 TTE W/DOPPLER COMPLETE: CPT

## 2022-03-24 RX ORDER — METOPROLOL SUCCINATE 25 MG/1
25 TABLET, EXTENDED RELEASE ORAL
Refills: 0 | Status: ACTIVE | COMMUNITY

## 2022-03-24 RX ORDER — FUROSEMIDE 20 MG/1
20 TABLET ORAL
Qty: 90 | Refills: 3 | Status: DISCONTINUED | COMMUNITY
Start: 2021-12-29 | End: 2022-03-24

## 2022-03-24 RX ORDER — FUROSEMIDE 20 MG/1
20 TABLET ORAL
Refills: 0 | Status: ACTIVE | COMMUNITY

## 2022-05-12 NOTE — ED PROVIDER NOTE - AREA
To pacu from OR. Pt awake, denies pain. Dressing to right breast dry and intact. Radial pulses palpable. IV infusing. Monitor in sinus rhythm. posterior

## 2022-06-16 ENCOUNTER — RESULT REVIEW (OUTPATIENT)
Age: 57
End: 2022-06-16

## 2022-06-18 NOTE — ED ADULT NURSE NOTE - CAPILLARY REFILL
Normal vision: sees adequately in most situations; can see medication labels, newsprint
2 seconds or less

## 2022-07-03 ENCOUNTER — RX RENEWAL (OUTPATIENT)
Age: 57
End: 2022-07-03

## 2022-07-08 NOTE — DISCHARGE NOTE ADULT - THE PATIENT HAS
no difficulties Klisyri Pregnancy And Lactation Text: It is unknown if this medication can harm a developing fetus or if it is excreted in breast milk.

## 2022-08-07 ENCOUNTER — EMERGENCY (EMERGENCY)
Facility: HOSPITAL | Age: 57
LOS: 1 days | Discharge: HOME | End: 2022-08-07
Attending: STUDENT IN AN ORGANIZED HEALTH CARE EDUCATION/TRAINING PROGRAM | Admitting: EMERGENCY MEDICINE
Payer: COMMERCIAL

## 2022-08-07 VITALS
HEART RATE: 77 BPM | TEMPERATURE: 98 F | DIASTOLIC BLOOD PRESSURE: 86 MMHG | OXYGEN SATURATION: 99 % | RESPIRATION RATE: 16 BRPM | SYSTOLIC BLOOD PRESSURE: 148 MMHG

## 2022-08-07 VITALS
WEIGHT: 229.94 LBS | SYSTOLIC BLOOD PRESSURE: 108 MMHG | OXYGEN SATURATION: 96 % | HEIGHT: 67 IN | HEART RATE: 61 BPM | DIASTOLIC BLOOD PRESSURE: 69 MMHG | TEMPERATURE: 98 F | RESPIRATION RATE: 16 BRPM

## 2022-08-07 DIAGNOSIS — Z86.018 PERSONAL HISTORY OF OTHER BENIGN NEOPLASM: Chronic | ICD-10-CM

## 2022-08-07 DIAGNOSIS — S83.207S UNSPECIFIED TEAR OF UNSPECIFIED MENISCUS, CURRENT INJURY, LEFT KNEE, SEQUELA: Chronic | ICD-10-CM

## 2022-08-07 LAB
ALBUMIN SERPL ELPH-MCNC: 3.5 G/DL — SIGNIFICANT CHANGE UP (ref 3.3–5)
ALP SERPL-CCNC: 68 U/L — SIGNIFICANT CHANGE UP (ref 40–120)
ALT FLD-CCNC: 27 U/L — SIGNIFICANT CHANGE UP (ref 12–78)
ANION GAP SERPL CALC-SCNC: 5 MMOL/L — SIGNIFICANT CHANGE UP (ref 5–17)
APTT BLD: 32 SEC — SIGNIFICANT CHANGE UP (ref 27.5–35.5)
AST SERPL-CCNC: 31 U/L — SIGNIFICANT CHANGE UP (ref 15–37)
BASOPHILS # BLD AUTO: 0.1 K/UL — SIGNIFICANT CHANGE UP (ref 0–0.2)
BASOPHILS NFR BLD AUTO: 1.4 % — SIGNIFICANT CHANGE UP (ref 0–2)
BILIRUB SERPL-MCNC: 0.8 MG/DL — SIGNIFICANT CHANGE UP (ref 0.2–1.2)
BUN SERPL-MCNC: 16 MG/DL — SIGNIFICANT CHANGE UP (ref 7–23)
CALCIUM SERPL-MCNC: 8.8 MG/DL — SIGNIFICANT CHANGE UP (ref 8.5–10.1)
CHLORIDE SERPL-SCNC: 112 MMOL/L — HIGH (ref 96–108)
CO2 SERPL-SCNC: 26 MMOL/L — SIGNIFICANT CHANGE UP (ref 22–31)
CREAT SERPL-MCNC: 0.93 MG/DL — SIGNIFICANT CHANGE UP (ref 0.5–1.3)
EGFR: 95 ML/MIN/1.73M2 — SIGNIFICANT CHANGE UP
EOSINOPHIL # BLD AUTO: 0.37 K/UL — SIGNIFICANT CHANGE UP (ref 0–0.5)
EOSINOPHIL NFR BLD AUTO: 5.3 % — SIGNIFICANT CHANGE UP (ref 0–6)
GLUCOSE SERPL-MCNC: 91 MG/DL — SIGNIFICANT CHANGE UP (ref 70–99)
HCT VFR BLD CALC: 42.5 % — SIGNIFICANT CHANGE UP (ref 39–50)
HGB BLD-MCNC: 14.8 G/DL — SIGNIFICANT CHANGE UP (ref 13–17)
IMM GRANULOCYTES NFR BLD AUTO: 0.4 % — SIGNIFICANT CHANGE UP (ref 0–1.5)
INR BLD: 1.29 RATIO — HIGH (ref 0.88–1.16)
LYMPHOCYTES # BLD AUTO: 2.02 K/UL — SIGNIFICANT CHANGE UP (ref 1–3.3)
LYMPHOCYTES # BLD AUTO: 29 % — SIGNIFICANT CHANGE UP (ref 13–44)
MCHC RBC-ENTMCNC: 30.9 PG — SIGNIFICANT CHANGE UP (ref 27–34)
MCHC RBC-ENTMCNC: 34.8 GM/DL — SIGNIFICANT CHANGE UP (ref 32–36)
MCV RBC AUTO: 88.7 FL — SIGNIFICANT CHANGE UP (ref 80–100)
MONOCYTES # BLD AUTO: 0.66 K/UL — SIGNIFICANT CHANGE UP (ref 0–0.9)
MONOCYTES NFR BLD AUTO: 9.5 % — SIGNIFICANT CHANGE UP (ref 2–14)
NEUTROPHILS # BLD AUTO: 3.78 K/UL — SIGNIFICANT CHANGE UP (ref 1.8–7.4)
NEUTROPHILS NFR BLD AUTO: 54.4 % — SIGNIFICANT CHANGE UP (ref 43–77)
NRBC # BLD: 0 /100 WBCS — SIGNIFICANT CHANGE UP (ref 0–0)
PLATELET # BLD AUTO: 344 K/UL — SIGNIFICANT CHANGE UP (ref 150–400)
POTASSIUM SERPL-MCNC: 4.3 MMOL/L — SIGNIFICANT CHANGE UP (ref 3.5–5.3)
POTASSIUM SERPL-SCNC: 4.3 MMOL/L — SIGNIFICANT CHANGE UP (ref 3.5–5.3)
PROT SERPL-MCNC: 6.6 G/DL — SIGNIFICANT CHANGE UP (ref 6–8.3)
PROTHROM AB SERPL-ACNC: 15.1 SEC — HIGH (ref 10.5–13.4)
RBC # BLD: 4.79 M/UL — SIGNIFICANT CHANGE UP (ref 4.2–5.8)
RBC # FLD: 13 % — SIGNIFICANT CHANGE UP (ref 10.3–14.5)
SODIUM SERPL-SCNC: 143 MMOL/L — SIGNIFICANT CHANGE UP (ref 135–145)
TROPONIN I, HIGH SENSITIVITY RESULT: 5.5 NG/L — SIGNIFICANT CHANGE UP
TROPONIN I, HIGH SENSITIVITY RESULT: 5.8 NG/L — SIGNIFICANT CHANGE UP
WBC # BLD: 6.96 K/UL — SIGNIFICANT CHANGE UP (ref 3.8–10.5)
WBC # FLD AUTO: 6.96 K/UL — SIGNIFICANT CHANGE UP (ref 3.8–10.5)

## 2022-08-07 PROCEDURE — 85610 PROTHROMBIN TIME: CPT

## 2022-08-07 PROCEDURE — 85730 THROMBOPLASTIN TIME PARTIAL: CPT

## 2022-08-07 PROCEDURE — 36415 COLL VENOUS BLD VENIPUNCTURE: CPT

## 2022-08-07 PROCEDURE — 71046 X-RAY EXAM CHEST 2 VIEWS: CPT | Mod: 26

## 2022-08-07 PROCEDURE — 71046 X-RAY EXAM CHEST 2 VIEWS: CPT

## 2022-08-07 PROCEDURE — 93010 ELECTROCARDIOGRAM REPORT: CPT

## 2022-08-07 PROCEDURE — 80053 COMPREHEN METABOLIC PANEL: CPT

## 2022-08-07 PROCEDURE — 84484 ASSAY OF TROPONIN QUANT: CPT

## 2022-08-07 PROCEDURE — 99284 EMERGENCY DEPT VISIT MOD MDM: CPT

## 2022-08-07 PROCEDURE — 85025 COMPLETE CBC W/AUTO DIFF WBC: CPT

## 2022-08-07 PROCEDURE — 93005 ELECTROCARDIOGRAM TRACING: CPT

## 2022-08-07 PROCEDURE — 99285 EMERGENCY DEPT VISIT HI MDM: CPT | Mod: 25

## 2022-08-07 RX ORDER — ASPIRIN/CALCIUM CARB/MAGNESIUM 324 MG
162 TABLET ORAL ONCE
Refills: 0 | Status: COMPLETED | OUTPATIENT
Start: 2022-08-07 | End: 2022-08-07

## 2022-08-07 RX ADMIN — Medication 162 MILLIGRAM(S): at 19:15

## 2022-08-07 NOTE — ED PROVIDER NOTE - NSFOLLOWUPINSTRUCTIONS_ED_ALL_ED_FT
1. TAKE ALL MEDICATIONS AS DIRECTED.    2. FOR PAIN OR FEVER YOU CAN TAKE ACETAMINOPHEN (TYLENOL) AS NEEDED, AS DIRECTED ON PACKAGING.  3. FOLLOW UP WITH YOUR PRIMARY DOCTOR WITHIN 5 DAYS AS DIRECTED. Follow up with cardiology this week  4. IF YOU HAD LABS OR IMAGING DONE, YOU WERE GIVEN COPIES OF ALL LABS AND/OR IMAGING RESULTS FROM YOUR ER VISIT--PLEASE TAKE THEM WITH YOU TO YOUR FOLLOW UP APPOINTMENTS.  5. IF NEEDED, CALL PATIENT ACCESS SERVICES AT [phone] TO FIND A PRIMARY CARE PHYSICIAN.  OR CALL [phone] TO MAKE AN APPOINTMENT WITH THE CLINIC.  6. RETURN TO THE ER FOR ANY WORSENING SYMPTOMS OR CONCERNS.    Chest Pain    Chest pain can be caused by many different conditions which may or may not be dangerous. Causes include heartburn, lung infections, heart attack, blood clot in lungs, skin infections, strain or damage to muscle, cartilage, or bones, etc. In addition to a history and physical examination, an electrocardiogram (ECG) or other lab tests may have been performed to determine the cause of your chest pain. Follow up with your primary care provider or with a cardiologist as instructed.     SEEK IMMEDIATE MEDICAL CARE IF YOU HAVE ANY OF THE FOLLOWING SYMPTOMS: worsening chest pain, coughing up blood, unexplained back/neck/jaw pain, severe abdominal pain, dizziness or lightheadedness, fainting, shortness of breath, sweaty or clammy skin, vomiting, or racing heart beat. These symptoms may represent a serious problem that is an emergency. Do not wait to see if the symptoms will go away. Get medical help right away. Call [phone] and do not drive yourself to the hospital.

## 2022-08-07 NOTE — ED PROVIDER NOTE - CARE PROVIDER_API CALL
Gibson Holland)  Cardiovascular Disease; Internal Medicine  [address], [place] [id number]  Phone: [phone]  Fax: [phone]  Follow Up Time: 4-6 Days

## 2022-08-07 NOTE — ED ADULT NURSE NOTE - BEFAST FACE
blood culture collected on 17th shows growth in the aerobic bottle gram positive cocci in pairs and chains No

## 2022-08-07 NOTE — ED PROVIDER NOTE - CLINICAL SUMMARY MEDICAL DECISION MAKING FREE TEXT BOX
56yo M with intermittent cp since yesterday labs, ekg, trop, cards 56yo M with intermittent cp since yesterday labs, ekg, trop, cards. pt with trop neg x 2, will d/c for outpt fu as per cards

## 2022-08-07 NOTE — ED ADULT TRIAGE NOTE - CHIEF COMPLAINT QUOTE
Patient is a 58yo male complaining of intermittent chest pain since Friday Patient denies shortness of breath nausea vomiting diarrhea Patient states he also has been suffering from anxiety lately. Patient has history of MI with one stent

## 2022-08-07 NOTE — ED PROVIDER NOTE - PATIENT PORTAL LINK FT
You can access the FollowMyHealth Patient Portal offered by [facility] by registering at the following website: [email or web address]. By joining [employer]’s FollowMyHealth portal, you will also be able to view your health information using other applications (apps) compatible with our system.

## 2022-08-07 NOTE — ED ADULT NURSE REASSESSMENT NOTE - NS ED NURSE REASSESS COMMENT FT1
pt sitting up in chair denies chest pain or shortness of breath patient noted to be connected to bedside continues cardiac monitor, no cute distress, patient repeat troponin sent pending results will continue to monitor patient

## 2022-08-07 NOTE — ED PROVIDER NOTE - OBJECTIVE STATEMENT
56yo M ho cad, stent x 1, cards dr lundberg, htn, hld, anxiety, pw intermittent chest pain since yesterday, symptoms worse with any movement and lasts seconds no assoc symptoms sob, nausea, diaphoresis, does not feel simialr to prior MI, no coughno fevers 58yo M ho cad, stent x 1, cards dr lundberg, htn, hld, anxiety, pw intermittent chest pain since yesterday, symptoms worse with any movement and lasts seconds no assoc symptoms sob, nausea, diaphoresis, does not feel simialr to prior MI, no coughno fevers  pt also states he has been suffering from anxiety lately, not sure if this is related

## 2022-08-07 NOTE — ED ADULT NURSE NOTE - CHIEF COMPLAINT QUOTE
Patient is a 56yo male complaining of intermittent chest pain since Friday Patient denies shortness of breath nausea vomiting diarrhea Patient states he also has been suffering from anxiety lately. Patient has history of MI with one stent

## 2022-08-07 NOTE — CONSULT NOTE ADULT - SUBJECTIVE AND OBJECTIVE BOX
[facility] - Karyn Travis, Frank Whyte, Kiah, Kevin, Amalia Wood  Office Number: [phone]    Initial Consult Note  CHIEF COMPLAINT: Patient is a 57y old  Male who presents with a chief complaint of chest pain     HPI: 56 year old male who has an NSTEMI in 2018, s/p PCI to his LCx in 2018, htn, hld, anxiety, p/w intermittent chest pain since yesterday, symptoms worse with any movement and lasts seconds no assoc symptoms sob, nausea, diaphoresis, does not feel similar to prior MI.     In ER, In ED, T(F): 97.7, HR: 61, BP: 108/69, RR: 16, SpO2: 96%. Labs pending.     Allergies    No Known Allergies    Intolerances      PAST MEDICAL & SURGICAL HISTORY:  Anxiety      ANYA on CPAP      CAD (coronary artery disease)      H/O lipoma  neck      Tear of meniscus of left knee, sequela        MEDICATIONS  (STANDING):  aspirin  chewable 162 milliGRAM(s) Oral once    MEDICATIONS  (PRN):    FAMILY HISTORY:  Family history of heart disease (Grandparent)       No family history of acute MI or sudden cardiac death.    SOCIAL HISTORY: No active tobacco, ethanol, or drug abuse.    REVIEW OF SYSTEMS   All other review of systems is negative unless indicated above.    VITAL SIGNS:   Vital Signs Last 24 Hrs  T(C): 36.5 (07 Aug 2022 14:35), Max: 36.5 (07 Aug 2022 14:35)  T(F): 97.7 (07 Aug 2022 14:35), Max: 97.7 (07 Aug 2022 14:35)  HR: 61 (07 Aug 2022 14:35) (61 - 61)  BP: 108/69 (07 Aug 2022 14:35) (108/69 - 108/69)  BP(mean): --  RR: 16 (07 Aug 2022 14:35) (16 - 16)  SpO2: 96% (07 Aug 2022 14:35) (96% - 96%)    Parameters below as of 07 Aug 2022 14:35  Patient On (Oxygen Delivery Method): room air        Physical Exam:  Constitutional: NAD, awake and alert, Well developed   HEENT: Moist Mucous Membranes, Anicteric  Pulmonary: Non-labored, breath sounds are clear bilaterally, No wheezing, rales or rhonchi  Cardiovascular: Regular, S1 and S2, No murmurs, No rubs, gallops or clicks  Gastrointestinal: Bowel Sounds present, soft, nontender.   Lymph: No peripheral edema. No lymphadenopathy.  Skin: No visible rashes or ulcers.  Psych:  Mood & affect appropriate    I&O's Summary      LABS: All Labs Reviewed:            Blood Culture:       Patient name: UMAIR GRANDA  YOB: 1965   Age: 53 (M)   MR#: [id number]  Study Date: 9/1/2018  [facility]  Study quality: Technically difficult  Referring Physician: Jerry Palacio MD  Blood Pressure: 113/84 mmHg  Height: 170 cm  Weight: 100 kg  BSA: 2.1 m2  ------------------------------------------------------------------------  PROCEDURE: Transthoracic echocardiogram with 2-D, M-Mode  and complete spectral and color flow Doppler. Verbal  consent was obtained for injection of  Ultrasonic Enhancing  Agent following a discussion of risks and benefits.  Following intravenous injection of Ultrasonic Enhancing  Agent , harmonic imaging was performed.  INDICATION: Non-ST elevation (NSTEMI) myocardial infarction  (I21.4)  ------------------------------------------------------------------------  Dimensions:    Normal Values:  LA:     3.8    2.0 - 4.0 cm  Ao:     3.3    2.0 - 3.8 cm  SEPTUM: 0.7    0.6 - 1.2 cm  PWT:    0.8    0.6 - 1.1 cm  LVIDd:  5.2    3.0 - 5.6 cm  LVIDs:  3.3    1.8 - 4.0 cm  Derived variables:  LVMI: 63 g/m2  RWT: 0.30  Fractional short: 37 %  EF (Teicholtz): 66 %  ------------------------------------------------------------------------  Observations:  Mitral Valve: Mitral annular calcification, otherwise  normal mitral valve. Mild mitral regurgitation.  Aortic Valve/Aorta: Calcified trileaflet aortic valve with  normal opening. Mild aortic regurgitation.  Normal aortic root size. (Ao: 3.3 cm at the sinusesof  Valsalva).  Left Atrium: Normal left atrium.  Left Ventricle: Endocardium not well visualized; grossly  normal left ventricular systolic function.  Endocardial  visualization enhanced with intravenous injection of  Ultrasonic Enhancing Agent (Definity). Normal left  ventricular internal dimensions and wall thickness.  Right Heart: Normal right atrium. The right ventricle is  not well visualized; grossly normal right ventricular  systolic function. Normal tricuspid valve. Minimal  tricuspid regurgitation. Normal pulmonic valve. Mild  pulmonic regurgitation.  Pericardium/Pleura: Normal pericardium with no pericardial  effusion.  Hemodynamic: Estimated right atrial pressure is 8 mm Hg.  ------------------------------------------------------------------------  Conclusions:  1. Mitral annular calcification, otherwise normal mitral  valve. Mild mitral regurgitation.  2. Calcified trileaflet aortic valve with normal opening.  Mild aortic regurgitation.  3. Normal left ventricular internal dimensions and wall  thickness.  4. Endocardium not well visualized; grossly normal left  ventricular systolic function.  Endocardial visualization  enhanced with intravenous injection of Ultrasonic Enhancing  Agent (Definity).  5. The right ventricle is not well visualized; grossly  normal right ventricular systolic function.  ------------------------------------------------------------------------  Confirmed on  9/1/2018 - 14:18:41 by Arturo Elizondo M.D.  ------------------------------------------------------------------------   [facility] - Karyn Travis, Frank Whyte, Kiah, Kevin, Amalia Wood  Office Number: [phone]    Initial Consult Note  CHIEF COMPLAINT: Patient is a 57y old  Male who presents with a chief complaint of chest pain     HPI: 56 year old male who has an NSTEMI in 2018, s/p PCI to his LCx in 2018, htn, hld, anxiety, p/w intermittent chest pain x 2 days. Patient reports that, symptoms worse with any movement and lasts seconds, Denies any discomfort and cannot elicit the same symptoms with movement. No assoc symptoms sob, nausea, diaphoresis, does not feel similar to prior MI. Denies STAPLES, orthopnea, PND.     In ER, In ED, T(F): 97.7, HR: 61, BP: 108/69, RR: 16, SpO2: 96%. Labs pending. EKG showed SB @ 58. Similar to old EKG.     Allergies    No Known Allergies    Intolerances      PAST MEDICAL & SURGICAL HISTORY:  Anxiety      ANYA on CPAP      CAD (coronary artery disease)      H/O lipoma  neck      Tear of meniscus of left knee, sequela        MEDICATIONS  (STANDING):  aspirin  chewable 162 milliGRAM(s) Oral once    MEDICATIONS  (PRN):    FAMILY HISTORY:  Family history of heart disease (Grandparent)    SOCIAL HISTORY: No active tobacco, ethanol, or drug abuse. Former smoker    REVIEW OF SYSTEMS   All other review of systems is negative unless indicated above.    VITAL SIGNS:   Vital Signs Last 24 Hrs  T(C): 36.5 (07 Aug 2022 14:35), Max: 36.5 (07 Aug 2022 14:35)  T(F): 97.7 (07 Aug 2022 14:35), Max: 97.7 (07 Aug 2022 14:35)  HR: 61 (07 Aug 2022 14:35) (61 - 61)  BP: 108/69 (07 Aug 2022 14:35) (108/69 - 108/69)  BP(mean): --  RR: 16 (07 Aug 2022 14:35) (16 - 16)  SpO2: 96% (07 Aug 2022 14:35) (96% - 96%)    Parameters below as of 07 Aug 2022 14:35  Patient On (Oxygen Delivery Method): room air        Physical Exam:  Constitutional: NAD, awake and alert, Well developed   HEENT: Moist Mucous Membranes, Anicteric  Pulmonary: Non-labored, breath sounds are clear bilaterally, No wheezing, rales or rhonchi  Cardiovascular: Regular, S1 and S2, No murmurs, No rubs, gallops or clicks  Gastrointestinal: Bowel Sounds present, soft, nontender.   Lymph: No peripheral edema. No lymphadenopathy.  Skin: No visible rashes or ulcers.  Psych:  Mood & affect appropriate    I&O's Summary      LABS: Pending     Patient name: UMAIR GRANDA  YOB: 1965   Age: 53 (M)   MR#: [id number]  Study Date: 9/1/2018  [facility]  Study quality: Technically difficult  Referring Physician: Jerry Palacio MD  Blood Pressure: 113/84 mmHg  Height: 170 cm  Weight: 100 kg  BSA: 2.1 m2  ------------------------------------------------------------------------  PROCEDURE: Transthoracic echocardiogram with 2-D, M-Mode  and complete spectral and color flow Doppler. Verbal  consent was obtained for injection of  Ultrasonic Enhancing  Agent following a discussion of risks and benefits.  Following intravenous injection of Ultrasonic Enhancing  Agent , harmonic imaging was performed.  INDICATION: Non-ST elevation (NSTEMI) myocardial infarction  (I21.4)  ------------------------------------------------------------------------  Dimensions:    Normal Values:  LA:     3.8    2.0 - 4.0 cm  Ao:     3.3    2.0 - 3.8 cm  SEPTUM: 0.7    0.6 - 1.2 cm  PWT:    0.8    0.6 - 1.1 cm  LVIDd:  5.2    3.0 - 5.6 cm  LVIDs:  3.3    1.8 - 4.0 cm  Derived variables:  LVMI: 63 g/m2  RWT: 0.30  Fractional short: 37 %  EF (Teicholtz): 66 %  ------------------------------------------------------------------------  Observations:  Mitral Valve: Mitral annular calcification, otherwise  normal mitral valve. Mild mitral regurgitation.  Aortic Valve/Aorta: Calcified trileaflet aortic valve with  normal opening. Mild aortic regurgitation.  Normal aortic root size. (Ao: 3.3 cm at the sinusesof  Valsalva).  Left Atrium: Normal left atrium.  Left Ventricle: Endocardium not well visualized; grossly  normal left ventricular systolic function.  Endocardial  visualization enhanced with intravenous injection of  Ultrasonic Enhancing Agent (Definity). Normal left  ventricular internal dimensions and wall thickness.  Right Heart: Normal right atrium. The right ventricle is  not well visualized; grossly normal right ventricular  systolic function. Normal tricuspid valve. Minimal  tricuspid regurgitation. Normal pulmonic valve. Mild  pulmonic regurgitation.  Pericardium/Pleura: Normal pericardium with no pericardial  effusion.  Hemodynamic: Estimated right atrial pressure is 8 mm Hg.  ------------------------------------------------------------------------  Conclusions:  1. Mitral annular calcification, otherwise normal mitral  valve. Mild mitral regurgitation.  2. Calcified trileaflet aortic valve with normal opening.  Mild aortic regurgitation.  3. Normal left ventricular internal dimensions and wall  thickness.  4. Endocardium not well visualized; grossly normal left  ventricular systolic function.  Endocardial visualization  enhanced with intravenous injection of Ultrasonic Enhancing  Agent (Definity).  5. The right ventricle is not well visualized; grossly  normal right ventricular systolic function.  ------------------------------------------------------------------------  Confirmed on  9/1/2018 - 14:18:41 by Arturo Elizondo M.D.  ------------------------------------------------------------------------

## 2022-08-07 NOTE — CONSULT NOTE ADULT - ASSESSMENT
DOCUMENTATION IN PROGRESS  56 year old male CAD, NSTEMI in 2018, s/p PCI to his LCx in 2018, HTN, HLD, anxiety, p/w intermittent chest pain       - Reports chest pain since yesterday, symptoms worse with any movement and lasts seconds no assoc symptoms sob, nausea, diaphoresis, does not feel similar to prior MI.   - EKG showed   - Patient is not complaining of any cardiac symptoms at this time.  - Chest pain atypical in nature.   - No clear evidence of acute ischemia.   - Troponin negative x 1. Follow up second set.  - Patient stable from cardiac to be discharged home, if cardiac enzymes negative x 2   - Patient can follow up outpatient for further cardiac care.       Keila Ricks, MS FNP, [facility]  Nurse Practitioner- Cardiology   Spectra # [id number] /[phone] 56 year old male CAD, NSTEMI in 2018, s/p PCI to his LCx in 2018, HTN, HLD, anxiety, p/w intermittent chest pain. Cardiology called to r/o ACS    - Reports chest pain x 2 days, symptoms worse with any movement and lasts seconds no assoc symptoms sob, nausea, diaphoresis, does not feel similar to prior MI.   - EKG showed SB, similar to old EKG  - Nuclear stress test 08/2019 with fixed defects   - Patient is not complaining of any cardiac symptoms at this time.  - Chest pain atypical in nature.   - No clear evidence of acute ischemia.   - Follow up cardiac enzymes   - Patient stable from cardiac to be discharged home, if cardiac enzymes negative x 2   - Continue aspirin, Lipitor BB and ACE  - Patient can follow up outpatient for further cardiac care.       Keila Ricks, MS FNP, [facility]  Nurse Practitioner- Cardiology   Spectra # [id number] /[phone]

## 2022-08-07 NOTE — CONSULT NOTE ADULT - NS ATTEND AMEND GEN_ALL_CORE FT
The patient has symptoms of atypical chest pain. The patient's chest pain is unlikely related to an acute coronary syndrome.   The EKG does not have any new  ischemic changes when compared to previous. Trend marcel. No signs of volume overload.  At this point, the patient can be discharged from a cardiac standpoint if ED allen neg and will followup as an outpt for further cardiac testing.

## 2022-08-07 NOTE — ED ADULT TRIAGE NOTE - AS TEMP SITE
[Colposcopy with Loop Electrode Excision of the Cervix] : Colposcopy with loop electrode excision of the cervix [Time out performed] : Pre-procedure time out performed.  Patient's name, date of birth and procedure confirmed. 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 [Risks] : risks [Benefits] : benefits [Alternatives] : alternatives [Patient] : patient [Infection] : infection [Bleeding] : bleeding [Allergic Reaction] : allergic reaction [Injury to Vagina] : injury to vagina skin [Injury to Cervix] : injury to cervix [Negative] : negative pregnancy test [Pap Performed] : pap not performed [SCJ Fully Visualized] : SCJ fully visualized [Cutting Setting ___watts] : cutting setting [unfilled] guillen [Coag Setting ___watts] : coag setting [unfilled] guillen [Blend Setting ___watts] : blend setting [unfilled] guillen [ECC Done] : ECC not done [Hemostasis Obtained] : Hemostasis obtained [Sent to Pathology] : the specimen was placed in buffered formalin and sent for pathology [Tolerated Well] : the patient tolerated the procedure well

## 2022-08-07 NOTE — ED PROVIDER NOTE - PHYSICAL EXAMINATION
Gen: Well appearing in NAD  Head: NC/AT  Neck: trachea midline  cv: rrr  Resp:  No distress, lungs clear  abd nontedern   Ext: no deformities  Neuro:  A&O appears non focal  Skin:  Warm and dry as visualized  Psych:  Normal affect and mood

## 2023-05-13 ENCOUNTER — RX RENEWAL (OUTPATIENT)
Age: 58
End: 2023-05-13

## 2023-05-18 NOTE — DISCHARGE NOTE ADULT - MEDICATION SUMMARY - MEDICATIONS TO CHANGE
I will SWITCH the dose or number of times a day I take the medications listed below when I get home from the hospital:  None
None

## 2023-05-31 ENCOUNTER — APPOINTMENT (OUTPATIENT)
Dept: CARDIOLOGY | Facility: CLINIC | Age: 58
End: 2023-05-31

## 2023-06-13 ENCOUNTER — RX RENEWAL (OUTPATIENT)
Age: 58
End: 2023-06-13

## 2023-06-28 NOTE — DISCHARGE NOTE ADULT - MEDICATION SUMMARY - MEDICATIONS TO TAKE
I will START or STAY ON the medications listed below when I get home from the hospital:    Aspirin Enteric Coated 325 mg oral delayed release tablet  -- 1 tab(s) by mouth once a day  -- Indication: For Heart    PROzac 40 mg oral capsule  -- 1 cap(s) by mouth once a day    home  -- Indication: For anxiety    atorvastatin 80 mg oral tablet  -- 1 tab(s) by mouth once a day (at bedtime) MDD:1  -- Indication: For High lipids    Plavix 300 mg oral tablet  -- 1 tab(s) by mouth once    hospital MDD:1  -- Indication: For Heart    metoprolol tartrate 25 mg oral tablet  -- 1 tab(s) by mouth every 12 hours MDD:2  -- Indication: For Heart left knee I will START or STAY ON the medications listed below when I get home from the hospital:    aspirin 81 mg oral delayed release tablet  -- 1 tab(s) by mouth once a day  -- Indication: For Cad     PROzac 40 mg oral capsule  -- 1 cap(s) by mouth once a day    home  -- Indication: For anxiety    atorvastatin 80 mg oral tablet  -- 1 tab(s) by mouth once a day (at bedtime) MDD:1  -- Indication: For High lipids    Plavix 300 mg oral tablet  -- 1 tab(s) by mouth once    hospital MDD:1  -- Indication: For Heart    clopidogrel 75 mg oral tablet  -- 1 tab(s) by mouth once a day MDD:1  -- Indication: For Cad     metoprolol succinate 50 mg oral tablet, extended release  -- 1 tab(s) by mouth once a day   -- It is very important that you take or use this exactly as directed.  Do not skip doses or discontinue unless directed by your doctor.  May cause drowsiness.  Alcohol may intensify this effect.  Use care when operating dangerous machinery.  Some non-prescription drugs may aggravate your condition.  Read all labels carefully.  If a warning appears, check with your doctor before taking.  Swallow whole.  Do not crush.  Take with food or milk.  This drug may impair the ability to drive or operate machinery.  Use care until you become familiar with its effects.    -- Indication: For Non-ST elevation (NSTEMI) myocardial infarction

## 2023-08-28 ENCOUNTER — APPOINTMENT (OUTPATIENT)
Dept: CARDIOLOGY | Facility: CLINIC | Age: 58
End: 2023-08-28
Payer: COMMERCIAL

## 2023-08-28 ENCOUNTER — NON-APPOINTMENT (OUTPATIENT)
Age: 58
End: 2023-08-28

## 2023-08-28 VITALS
DIASTOLIC BLOOD PRESSURE: 91 MMHG | BODY MASS INDEX: 37.98 KG/M2 | HEART RATE: 78 BPM | WEIGHT: 242 LBS | OXYGEN SATURATION: 96 % | SYSTOLIC BLOOD PRESSURE: 138 MMHG | HEIGHT: 67 IN

## 2023-08-28 DIAGNOSIS — E78.5 HYPERLIPIDEMIA, UNSPECIFIED: ICD-10-CM

## 2023-08-28 PROCEDURE — 93000 ELECTROCARDIOGRAM COMPLETE: CPT

## 2023-08-28 PROCEDURE — 99214 OFFICE O/P EST MOD 30 MIN: CPT | Mod: 25

## 2023-08-28 RX ORDER — LISINOPRIL 2.5 MG/1
2.5 TABLET ORAL DAILY
Qty: 90 | Refills: 0 | Status: DISCONTINUED | COMMUNITY
Start: 2018-09-06 | End: 2023-08-28

## 2023-08-28 RX ORDER — FLUOXETINE HYDROCHLORIDE 40 MG/1
40 CAPSULE ORAL
Qty: 30 | Refills: 0 | Status: DISCONTINUED | COMMUNITY
Start: 2021-12-10 | End: 2023-08-28

## 2023-08-28 NOTE — HISTORY OF PRESENT ILLNESS
[FreeTextEntry1] : Mr. Miller is a 58 year old male here for follow up evaluation.  On August 31, 2018 he presented to the [place] emergency room with bilateral shoulder pain and chest pain. The pain was initially felt while on a treadmill. He was found to have positive cardiac enzymes, and was transferred to [facility] for cardiac cath. He was found to have a 99% lesion of his mid left circumflex and is status post balloon angioplasty and stent. 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 Echocardiogram at this time demonstrated no significant valvular pathology, with normal left ventricular systolic function. He continues to have normal LV function as of 2/2019.  I last saw him in 1/22.  He is doing ok. He denies chest pain and dyspnea. He did have a bad cough, which was blamed on his allergies. He has not been eating well. His LDL did go up to 90. His edema has resolved and he has not been taking Lasix. He has no orthopnea, PND, lightheadedness, dizziness or near syncope.

## 2023-08-28 NOTE — DISCUSSION/SUMMARY
[With Me] : with me [___ Month(s)] : in [unfilled] month(s) [FreeTextEntry1] : Mr. Miller is a 58 year old male who has an NSTEMI in 2018, and is here for follow up evaluation.  He is status post PCI to his LCx in 2018.  Overall, he has been doing well, though has not been as strict with his exercise and diet.  His LDL did go up to 90, likely in the setting of dietary indiscretion.  His blood pressure is also borderline elevated today.  He has reported a cough recently, and I am stopping his lisinopril, and changing this to losartan 25.  He will remain on aspirin, and Lipitor, along with Toprol.  His LV function has been normal as of January 2022.  I have encouraged him to stay active. We discussed the importance of diet, and exercise. If no issues, I will see him again in 3 to 5 months. [EKG obtained to assist in diagnosis and management of assessed problem(s)] : EKG obtained to assist in diagnosis and management of assessed problem(s)

## 2023-10-16 RX ORDER — ATORVASTATIN CALCIUM 80 MG/1
80 TABLET, FILM COATED ORAL
Qty: 90 | Refills: 3 | Status: ACTIVE | COMMUNITY
Start: 2018-09-06

## 2024-02-02 ENCOUNTER — APPOINTMENT (OUTPATIENT)
Dept: CARDIOLOGY | Facility: CLINIC | Age: 59
End: 2024-02-02

## 2024-02-09 ENCOUNTER — RX RENEWAL (OUTPATIENT)
Age: 59
End: 2024-02-09

## 2024-02-09 RX ORDER — METOPROLOL SUCCINATE 25 MG/1
25 TABLET, EXTENDED RELEASE ORAL
Qty: 30 | Refills: 5 | Status: ACTIVE | COMMUNITY
Start: 2018-10-24

## 2024-04-08 ENCOUNTER — APPOINTMENT (OUTPATIENT)
Dept: CARDIOLOGY | Facility: CLINIC | Age: 59
End: 2024-04-08
Payer: COMMERCIAL

## 2024-04-08 ENCOUNTER — NON-APPOINTMENT (OUTPATIENT)
Age: 59
End: 2024-04-08

## 2024-04-08 VITALS
HEART RATE: 62 BPM | WEIGHT: 230 LBS | BODY MASS INDEX: 36.1 KG/M2 | DIASTOLIC BLOOD PRESSURE: 87 MMHG | OXYGEN SATURATION: 96 % | SYSTOLIC BLOOD PRESSURE: 142 MMHG | HEIGHT: 67 IN

## 2024-04-08 DIAGNOSIS — I25.10 ATHEROSCLEROTIC HEART DISEASE OF NATIVE CORONARY ARTERY W/OUT ANGINA PECTORIS: ICD-10-CM

## 2024-04-08 PROCEDURE — 99214 OFFICE O/P EST MOD 30 MIN: CPT | Mod: 25

## 2024-04-08 PROCEDURE — 93000 ELECTROCARDIOGRAM COMPLETE: CPT

## 2024-04-08 NOTE — DISCUSSION/SUMMARY
[With Me] : with me [___ Month(s)] : in [unfilled] month(s) [EKG obtained to assist in diagnosis and management of assessed problem(s)] : EKG obtained to assist in diagnosis and management of assessed problem(s) [FreeTextEntry1] : Mr. Miller is a 58 year old male who has an NSTEMI in 2018, and is here for follow up evaluation.  He is status post PCI to his LCx in 2018.  Overall, he has been doing well, though has not been as strict with his exercise and diet. Once his ankle heals, we will see where his numbers have settled. His blood pressure is also borderline elevated today, and he will monitor them at home. He has been taking anti inflammatories more frequently because of the ankle.   He will remain on aspirin, and Lipitor, along with Toprol and losartan.  His LV function has been normal as of January 2022.  I have encouraged him to stay active. We discussed the importance of diet, and exercise. If no issues, I will see him again in 6 months. We will repeat a stress test at this time, per guidelines.

## 2024-04-08 NOTE — HISTORY OF PRESENT ILLNESS
[FreeTextEntry1] : Mr. Miller is a 58 year old male here for follow up evaluation.  On August 31, 2018 he presented to the [place] emergency room with bilateral shoulder pain and chest pain. The pain was initially felt while on a treadmill. He was found to have positive cardiac enzymes, and was transferred to [facility] for cardiac cath. He was found to have a 99% lesion of his mid left circumflex and is status post balloon angioplasty and stent. 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 Echocardiogram at this time demonstrated no significant valvular pathology, with normal left ventricular systolic function. He continues to have normal LV function as of 2/2019.  I last saw him in 8/23. He is doing well today, other than breaking his right ankle. He has no chest pain. He is not on a diuretic. He has no orthopnea, PND, lightheadedness, dizziness or near syncope. His cough has disappeared off lisinopril.

## 2024-06-03 RX ORDER — LOSARTAN POTASSIUM 25 MG/1
25 TABLET, FILM COATED ORAL DAILY
Qty: 90 | Refills: 2 | Status: ACTIVE | COMMUNITY
Start: 2023-08-28

## 2024-11-10 NOTE — ED PROVIDER NOTE - NSICDXPASTMEDICALHX_GEN_ALL_CORE_FT
Pt presents from urgent care with a L leg  laceration. EMS reported that the laceration was about an inch deep per urgent care. However, when patient was assessed in triage, the urgent care left the patients compression socks on and wrapped the wound with guaze over the compression sock.  Compression sock and gauze removed, wound assessed and cleaned.     Pt reports she thinks she may have scraped her leg on a friends car. She is diabetic, presents with BLE, and presents hypoxic at 88%. She does not wear O2 at baseline. She is placed on 3L NC O2 due to hypoxia.   
PAST MEDICAL HISTORY:  Anxiety     CAD (coronary artery disease)     ANYA on CPAP

## 2025-01-28 ENCOUNTER — NON-APPOINTMENT (OUTPATIENT)
Age: 60
End: 2025-01-28

## 2025-01-28 ENCOUNTER — APPOINTMENT (OUTPATIENT)
Dept: CARDIOLOGY | Facility: CLINIC | Age: 60
End: 2025-01-28
Payer: COMMERCIAL

## 2025-01-28 VITALS
HEIGHT: 67 IN | DIASTOLIC BLOOD PRESSURE: 67 MMHG | HEART RATE: 53 BPM | SYSTOLIC BLOOD PRESSURE: 104 MMHG | OXYGEN SATURATION: 98 %

## 2025-01-28 DIAGNOSIS — Z95.5 PRESENCE OF CORONARY ANGIOPLASTY IMPLANT AND GRAFT: ICD-10-CM

## 2025-01-28 DIAGNOSIS — E78.5 HYPERLIPIDEMIA, UNSPECIFIED: ICD-10-CM

## 2025-01-28 DIAGNOSIS — I25.10 ATHEROSCLEROTIC HEART DISEASE OF NATIVE CORONARY ARTERY W/OUT ANGINA PECTORIS: ICD-10-CM

## 2025-01-28 PROCEDURE — 99214 OFFICE O/P EST MOD 30 MIN: CPT | Mod: 25

## 2025-01-28 PROCEDURE — 93000 ELECTROCARDIOGRAM COMPLETE: CPT

## 2025-01-28 RX ORDER — VORTIOXETINE 20 MG/1
20 TABLET, FILM COATED ORAL
Refills: 0 | Status: ACTIVE | COMMUNITY

## 2025-01-28 RX ORDER — ARIPIPRAZOLE 400 MG
KIT INTRAMUSCULAR
Refills: 0 | Status: ACTIVE | COMMUNITY

## 2025-02-24 ENCOUNTER — NON-APPOINTMENT (OUTPATIENT)
Age: 60
End: 2025-02-24

## 2025-02-26 ENCOUNTER — APPOINTMENT (OUTPATIENT)
Dept: CARDIOLOGY | Facility: CLINIC | Age: 60
End: 2025-02-26
Payer: COMMERCIAL

## 2025-02-26 PROCEDURE — A9500: CPT

## 2025-02-26 PROCEDURE — 78452 HT MUSCLE IMAGE SPECT MULT: CPT

## 2025-02-26 PROCEDURE — 93306 TTE W/DOPPLER COMPLETE: CPT

## 2025-02-26 PROCEDURE — 93015 CV STRESS TEST SUPVJ I&R: CPT

## 2025-03-04 ENCOUNTER — APPOINTMENT (OUTPATIENT)
Dept: ORTHOPEDIC SURGERY | Facility: CLINIC | Age: 60
End: 2025-03-04
Payer: COMMERCIAL

## 2025-03-04 DIAGNOSIS — M17.12 UNILATERAL PRIMARY OSTEOARTHRITIS, LEFT KNEE: ICD-10-CM

## 2025-03-04 PROCEDURE — 99204 OFFICE O/P NEW MOD 45 MIN: CPT | Mod: 25

## 2025-03-04 PROCEDURE — 73564 X-RAY EXAM KNEE 4 OR MORE: CPT | Mod: LT

## 2025-03-04 PROCEDURE — 20610 DRAIN/INJ JOINT/BURSA W/O US: CPT | Mod: LT

## 2025-03-04 PROCEDURE — J3490M: CUSTOM | Mod: NC
